# Patient Record
Sex: MALE | Race: WHITE | NOT HISPANIC OR LATINO | URBAN - METROPOLITAN AREA
[De-identification: names, ages, dates, MRNs, and addresses within clinical notes are randomized per-mention and may not be internally consistent; named-entity substitution may affect disease eponyms.]

---

## 2017-03-27 ENCOUNTER — EMERGENCY (EMERGENCY)
Facility: HOSPITAL | Age: 52
LOS: 0 days | Discharge: HOME | End: 2017-03-28
Attending: EMERGENCY MEDICINE | Admitting: EMERGENCY MEDICINE
Payer: SELF-PAY

## 2017-03-27 VITALS
DIASTOLIC BLOOD PRESSURE: 76 MMHG | SYSTOLIC BLOOD PRESSURE: 130 MMHG | RESPIRATION RATE: 16 BRPM | WEIGHT: 162.04 LBS | TEMPERATURE: 98 F | HEART RATE: 89 BPM

## 2017-03-27 DIAGNOSIS — F10.129 ALCOHOL ABUSE WITH INTOXICATION, UNSPECIFIED: ICD-10-CM

## 2017-03-27 PROCEDURE — 99283 EMERGENCY DEPT VISIT LOW MDM: CPT

## 2017-03-28 VITALS
TEMPERATURE: 99 F | OXYGEN SATURATION: 97 % | RESPIRATION RATE: 16 BRPM | DIASTOLIC BLOOD PRESSURE: 83 MMHG | SYSTOLIC BLOOD PRESSURE: 142 MMHG | HEART RATE: 101 BPM

## 2017-03-28 NOTE — ED PROVIDER NOTE - MUSCULOSKELETAL, MLM
Spine appears normal, range of motion is not limited, no muscle or joint tenderness. No evidence of bony deformity.

## 2017-03-28 NOTE — ED PROVIDER NOTE - MEDICAL DECISION MAKING DETAILS
Pt very comfortable, NAD.  Ambulatory in the ER wo any ataxia.  Stable for DC to follow up with PMD.

## 2017-03-28 NOTE — ED PROVIDER NOTE - CONSTITUTIONAL, MLM
normal... Well appearing, well nourished, awake, alert, oriented to person, place, time/situation and in no apparent distress. AOB. Disheveled, unkempt.

## 2017-03-28 NOTE — ED PROVIDER NOTE - ENMT, MLM
Airway patent, Nasal mucosa clear. Mouth with normal mucosa. Throat has no vesicles, no oropharyngeal exudates and uvula is midline. No septal heme. No hemotympanum. No intraoral lesions. No midline c-spine tenderness.

## 2017-03-28 NOTE — ED PROVIDER NOTE - NS ED MD SCRIBE ATTENDING SCRIBE SECTIONS
PHYSICAL EXAM/PAST MEDICAL/SURGICAL/SOCIAL HISTORY/DISPOSITION/REVIEW OF SYSTEMS/HISTORY OF PRESENT ILLNESS/PROGRESS NOTE/RESULTS

## 2017-03-28 NOTE — ED PROVIDER NOTE - OBJECTIVE STATEMENT
52 y/o M presents to the ED for ETOH intoxication. Per EMS pt tripped and fell, denies head injury, denies LOC. Currently pt has no other complaints and denies neck pain, headache, and back pain.

## 2021-10-13 ENCOUNTER — EMERGENCY (EMERGENCY)
Facility: HOSPITAL | Age: 56
LOS: 1 days | Discharge: HOME | End: 2021-10-13
Attending: EMERGENCY MEDICINE
Payer: MEDICARE

## 2021-10-13 VITALS
DIASTOLIC BLOOD PRESSURE: 99 MMHG | SYSTOLIC BLOOD PRESSURE: 151 MMHG | OXYGEN SATURATION: 97 % | TEMPERATURE: 98 F | HEIGHT: 65 IN | WEIGHT: 164.02 LBS | RESPIRATION RATE: 15 BRPM | HEART RATE: 92 BPM

## 2021-10-13 PROCEDURE — 99283 EMERGENCY DEPT VISIT LOW MDM: CPT

## 2021-10-13 RX ORDER — IBUPROFEN 200 MG
600 TABLET ORAL ONCE
Refills: 0 | Status: COMPLETED | OUTPATIENT
Start: 2021-10-13 | End: 2021-10-13

## 2021-10-13 RX ORDER — IBUPROFEN 200 MG
1 TABLET ORAL
Qty: 30 | Refills: 0
Start: 2021-10-13

## 2021-10-13 RX ORDER — CYCLOBENZAPRINE HYDROCHLORIDE 10 MG/1
5 TABLET, FILM COATED ORAL ONCE
Refills: 0 | Status: COMPLETED | OUTPATIENT
Start: 2021-10-13 | End: 2021-10-13

## 2021-10-13 RX ORDER — CYCLOBENZAPRINE HYDROCHLORIDE 10 MG/1
1 TABLET, FILM COATED ORAL
Qty: 10 | Refills: 0
Start: 2021-10-13

## 2021-10-13 RX ADMIN — CYCLOBENZAPRINE HYDROCHLORIDE 5 MILLIGRAM(S): 10 TABLET, FILM COATED ORAL at 13:37

## 2021-10-13 RX ADMIN — Medication 600 MILLIGRAM(S): at 13:38

## 2021-10-13 NOTE — ED PROVIDER NOTE - OBJECTIVE STATEMENT
56 year old male states he was wrestling on the lawn 2 days ago, fell onto his buttock, felt his " sciatica get aggravated." states today he was walking and it worsened, now going down his left leg. able to bear weight and ambulate. Admits to 56 year old male states he was wrestling on the lawn 2 days ago, fell onto his buttock, felt his " sciatica get aggravated." states today he was walking and it worsened, now going down his left leg. able to bear weight and ambulate. Admits to abrasion to left ankle. Denies any OTC meds for symptoms, admits to stretching exercise with mild temp relief. 56 year old male states he was wrestling on the lawn 2 days ago, fell onto his buttock, felt his " sciatica get aggravated." states today he was walking and it worsened, now going down his left leg. able to bear weight and ambulate. Denies abd pain/N/V/saddle anesthesia/weakness/parasthesia.   Admits to abrasion to left ankle. Denies any OTC meds for symptoms, admits to stretching exercise with mild temp relief.

## 2021-10-13 NOTE — ED PROVIDER NOTE - MUSCULOSKELETAL MINIMAL EXAM
+ tenderness noted to left SI joint, negative SLR b/l + tenderness noted to left SI joint, negative SLR b/l.

## 2021-10-13 NOTE — ED ADULT TRIAGE NOTE - CHIEF COMPLAINT QUOTE
pt with c/o "sciatica pain" left buttocks pain radiating down left leg causing left foot numbness x2 days. pt ambulatory without assist.

## 2021-10-13 NOTE — ED PROVIDER NOTE - PROGRESS NOTE DETAILS
follow up with orthopedic as discussed. if any symptoms worsen or any new symptoms occur, return to eD

## 2021-10-13 NOTE — ED PROVIDER NOTE - PATIENT PORTAL LINK FT
You can access the FollowMyHealth Patient Portal offered by [facility] by registering at the following website: [email or web address]. By joining [employer]’s FollowMyHealth portal, you will also be able to view your health information using other applications (apps) compatible with our system.

## 2021-10-13 NOTE — ED PROVIDER NOTE - CONSTITUTIONAL, MLM
normal... Well appearing, awake, alert, oriented to person, place, time/situation and in no apparent distress.ambulating comfortably

## 2022-03-05 ENCOUNTER — EMERGENCY (EMERGENCY)
Facility: HOSPITAL | Age: 57
LOS: 1 days | Discharge: HOME | End: 2022-03-05
Attending: EMERGENCY MEDICINE
Payer: MEDICARE

## 2022-03-05 VITALS
RESPIRATION RATE: 18 BRPM | HEIGHT: 65 IN | DIASTOLIC BLOOD PRESSURE: 52 MMHG | SYSTOLIC BLOOD PRESSURE: 129 MMHG | OXYGEN SATURATION: 93 % | TEMPERATURE: 98 F | WEIGHT: 199.96 LBS | HEART RATE: 109 BPM

## 2022-03-05 VITALS
SYSTOLIC BLOOD PRESSURE: 118 MMHG | DIASTOLIC BLOOD PRESSURE: 69 MMHG | HEART RATE: 89 BPM | OXYGEN SATURATION: 96 % | RESPIRATION RATE: 18 BRPM | TEMPERATURE: 98 F

## 2022-03-05 PROCEDURE — 99284 EMERGENCY DEPT VISIT MOD MDM: CPT

## 2022-03-05 PROCEDURE — 99283 EMERGENCY DEPT VISIT LOW MDM: CPT

## 2022-03-05 RX ADMIN — Medication 50 MILLIGRAM(S): at 20:59

## 2022-03-05 NOTE — ED ADULT NURSE NOTE - OBJECTIVE STATEMENT
BIBEMS from [employer] for alcohol intoxication. Pt states, "I drank a lot of beers". GCS 14. Pt placed in yellow gown. Belongings secured.

## 2022-03-05 NOTE — ED ADULT NURSE NOTE - CHIEF COMPLAINT QUOTE
BIBEMS from [employer] for alcohol intoxication. Pt states, "I drank a lot of beers". GCS 14. Pt placed in yellow gown. Belongings secured.

## 2022-03-05 NOTE — ED ADULT NURSE NOTE - ASSOCIATED SYMPTOMS
BIBEMS from [employer] for alcohol intoxication. Pt states, "I drank a lot of beers". GCS 14. Pt placed in yellow gown. Belongings secured.

## 2022-03-05 NOTE — ED PROVIDER NOTE - PHYSICAL EXAMINATION
Gen: No acute distress, non toxic. no tremors  HEENT: Mucous membranes moist, pink conjunctivae, EOMI. facial flushing. no tongue fasiculations. ncat  CV: pulse ~105, nl s1/s2.  Resp: CTAB, normal rate and effort  GI: Abdomen soft, NT, ND. No rebound, no guarding  : No CVAT  Neuro: A&O x 3, moving all 4 extremities  MSK: No spine or joint tenderness to palpation  Skin: No rashes. intact and perfused.
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
## 2022-03-05 NOTE — ED ADULT TRIAGE NOTE - CHIEF COMPLAINT QUOTE
BIBEMS from [employer] for alcohol intoxication. Pt states, "I drank a lot of beers". GCS 14. Pt placed in yellow gown. Belongings secured.

## 2022-03-06 PROBLEM — Z78.9 OTHER SPECIFIED HEALTH STATUS: Chronic | Status: ACTIVE | Noted: 2021-10-13

## 2022-03-11 ENCOUNTER — EMERGENCY (EMERGENCY)
Facility: HOSPITAL | Age: 57
LOS: 1 days | Discharge: HOME | End: 2022-03-11
Attending: EMERGENCY MEDICINE
Payer: MEDICARE

## 2022-03-11 VITALS
WEIGHT: 195.11 LBS | OXYGEN SATURATION: 95 % | RESPIRATION RATE: 18 BRPM | TEMPERATURE: 98 F | HEART RATE: 97 BPM | HEIGHT: 65 IN | DIASTOLIC BLOOD PRESSURE: 75 MMHG | SYSTOLIC BLOOD PRESSURE: 129 MMHG

## 2022-03-11 PROCEDURE — 99284 EMERGENCY DEPT VISIT MOD MDM: CPT

## 2022-03-11 PROCEDURE — 96372 THER/PROPH/DIAG INJ SC/IM: CPT

## 2022-03-11 PROCEDURE — 99283 EMERGENCY DEPT VISIT LOW MDM: CPT | Mod: 25

## 2022-03-11 RX ORDER — CYCLOBENZAPRINE HYDROCHLORIDE 10 MG/1
1 TABLET, FILM COATED ORAL
Qty: 15 | Refills: 0
Start: 2022-03-11 | End: 2022-03-15

## 2022-03-11 RX ORDER — KETOROLAC TROMETHAMINE 30 MG/ML
15 SYRINGE (ML) INJECTION ONCE
Refills: 0 | Status: DISCONTINUED | OUTPATIENT
Start: 2022-03-11 | End: 2022-03-11

## 2022-03-11 RX ORDER — METHOCARBAMOL 500 MG/1
1500 TABLET, FILM COATED ORAL ONCE
Refills: 0 | Status: COMPLETED | OUTPATIENT
Start: 2022-03-11 | End: 2022-03-11

## 2022-03-11 RX ORDER — LIDOCAINE 4 G/100G
1 CREAM TOPICAL ONCE
Refills: 0 | Status: COMPLETED | OUTPATIENT
Start: 2022-03-11 | End: 2022-03-11

## 2022-03-11 RX ADMIN — METHOCARBAMOL 1500 MILLIGRAM(S): 500 TABLET, FILM COATED ORAL at 17:25

## 2022-03-11 RX ADMIN — LIDOCAINE 1 PATCH: 4 CREAM TOPICAL at 17:36

## 2022-03-11 RX ADMIN — Medication 15 MILLIGRAM(S): at 18:29

## 2022-03-11 RX ADMIN — Medication 15 MILLIGRAM(S): at 17:25

## 2022-03-11 NOTE — ED PROVIDER NOTE - PROGRESS NOTE DETAILS
[place]: pt feels better, neuro intact, steady gait, clinically appropriate without withdrawal. has ride home. return precautions. meds.
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
## 2022-03-11 NOTE — ED PROVIDER NOTE - PATIENT PORTAL LINK FT
You can access the FollowMyHealth Patient Portal offered by [facility] by registering at the following website: [email or web address]. By joining [employer]’s FollowMyHealth portal, you will also be able to view your health information using other applications (apps) compatible with our system.

## 2022-03-11 NOTE — ED PROVIDER NOTE - PHYSICAL EXAMINATION
Gen: No acute distress, non toxic etoh on breath  HEENT: Mucous membranes moist, pink conjunctivae, EOMI. ncat  Neck: no midline ttp  CV: RRR, nl s1/s2.  Resp: CTAB, normal rate and effort  GI: Abdomen soft, NT, ND. No rebound, no guarding  : No CVAT  Neuro: A&O x 3, moving all 4 extremities. sligthly slurring words.   MSK: No spine ttp. ttp over right lower back, no bruising/deformity. full rom b/l LE. no yesenia ttp  Skin: No rashes. intact and perfused.

## 2022-03-11 NOTE — ED PROVIDER NOTE - CLINICAL SUMMARY MEDICAL DECISION MAKING FREE TEXT BOX
alcohol abuse, 3 days right lower back pain, no sign trauma, neuro intact, ambulates, no yesenia ttp. will treat pain, suspect muscular, observe.

## 2022-03-11 NOTE — ED ADULT NURSE NOTE - NSIMPLEMENTINTERV_GEN_ALL_ED
Implemented All Fall Risk Interventions:  [place] to call system. Call bell, personal items and telephone within reach. Instruct patient to call for assistance. Room bathroom lighting operational. Non-slip footwear when patient is off stretcher. Physically safe environment: no spills, clutter or unnecessary equipment. Stretcher in lowest position, wheels locked, appropriate side rails in place. Provide visual cue, wrist band, yellow gown, etc. Monitor gait and stability. Monitor for mental status changes and reorient to person, place, and time. Review medications for side effects contributing to fall risk. Reinforce activity limits and safety measures with patient and family.

## 2022-03-11 NOTE — ED ADULT TRIAGE NOTE - CHIEF COMPLAINT QUOTE
patient c/o back pain for 3 days, worse on the right side. patient also admits to drinking beers today, alcohol on breath.

## 2022-03-11 NOTE — ED PROVIDER NOTE - NSFOLLOWUPINSTRUCTIONS_ED_ALL_ED_FT
Back Pain    Back pain is very common in adults. The cause of back pain is rarely dangerous and the pain often gets better over time. The cause of your back pain may not be known and may include strain of muscles or ligaments, degeneration of the spinal disks, or arthritis. Occasionally the pain may radiate down your leg(s). Over-the-counter medicines to reduce pain and inflammation are often the most helpful. Stretching and remaining active frequently helps the healing process.     SEEK IMMEDIATE MEDICAL CARE IF YOU HAVE ANY OF THE FOLLOWING SYMPTOMS: bowel or bladder control problems, unusual weakness or numbness in your arms or legs, nausea or vomiting, abdominal pain, fever, dizziness/lightheadedness.    Alcohol Abuse    Alcohol intoxication occurs when the amount of alcohol that a person has consumed impairs his or her ability to mentally and physically function. Chronic alcohol consumption can also lead to a variety of health issues including neurological disease, stomach disease, heart disease, liver disease, etc. Do not drive after drinking alcohol. Drinking enough alcohol to end up in an Emergency Room suggests you may have an alcohol abuse problem. Seek help at a drug addiction center.    SEEK IMMEDIATE MEDICAL CARE IF YOU HAVE ANY OF THE FOLLOWING SYMPTOMS: seizures, vomiting blood, blood in your stool, lightheadedness/dizziness, or becoming shaky to tremulous when you stop drinking.

## 2022-03-18 ENCOUNTER — EMERGENCY (EMERGENCY)
Facility: HOSPITAL | Age: 57
LOS: 1 days | Discharge: HOME | End: 2022-03-18
Attending: EMERGENCY MEDICINE
Payer: MEDICARE

## 2022-03-18 PROCEDURE — 99284 EMERGENCY DEPT VISIT MOD MDM: CPT

## 2022-03-19 VITALS
OXYGEN SATURATION: 93 % | HEIGHT: 65 IN | RESPIRATION RATE: 18 BRPM | WEIGHT: 164.91 LBS | HEART RATE: 90 BPM | DIASTOLIC BLOOD PRESSURE: 93 MMHG | TEMPERATURE: 98 F | SYSTOLIC BLOOD PRESSURE: 150 MMHG

## 2022-03-19 PROCEDURE — 96372 THER/PROPH/DIAG INJ SC/IM: CPT

## 2022-03-19 PROCEDURE — 99283 EMERGENCY DEPT VISIT LOW MDM: CPT

## 2022-03-19 RX ORDER — METHOCARBAMOL 500 MG/1
2 TABLET, FILM COATED ORAL
Qty: 24 | Refills: 0
Start: 2022-03-19 | End: 2022-03-21

## 2022-03-19 RX ORDER — LIDOCAINE 4 G/100G
1 CREAM TOPICAL ONCE
Refills: 0 | Status: COMPLETED | OUTPATIENT
Start: 2022-03-19 | End: 2022-03-19

## 2022-03-19 RX ORDER — METHOCARBAMOL 500 MG/1
1500 TABLET, FILM COATED ORAL ONCE
Refills: 0 | Status: COMPLETED | OUTPATIENT
Start: 2022-03-19 | End: 2022-03-19

## 2022-03-19 RX ORDER — IBUPROFEN 200 MG
2 TABLET ORAL
Qty: 42 | Refills: 0
Start: 2022-03-19 | End: 2022-03-25

## 2022-03-19 RX ORDER — KETOROLAC TROMETHAMINE 30 MG/ML
30 SYRINGE (ML) INJECTION ONCE
Refills: 0 | Status: DISCONTINUED | OUTPATIENT
Start: 2022-03-19 | End: 2022-03-19

## 2022-03-19 RX ORDER — IBUPROFEN 200 MG
1 TABLET ORAL
Qty: 21 | Refills: 0
Start: 2022-03-19 | End: 2022-03-25

## 2022-03-19 RX ADMIN — LIDOCAINE 1 PATCH: 4 CREAM TOPICAL at 01:22

## 2022-03-19 RX ADMIN — Medication 30 MILLIGRAM(S): at 01:22

## 2022-03-19 RX ADMIN — METHOCARBAMOL 1500 MILLIGRAM(S): 500 TABLET, FILM COATED ORAL at 01:22

## 2022-03-19 NOTE — ED PROVIDER NOTE - CLINICAL SUMMARY MEDICAL DECISION MAKING FREE TEXT BOX
55 yo male with back pain. Neurovascularly intact. no red flag sxs or risk factors. Ambulating well in ED. Given spine follow up and pain control. Patient understands return precautions and f/u.

## 2022-03-19 NOTE — ED PROVIDER NOTE - ATTENDING CONTRIBUTION TO CARE
Radicular low back pain with hx of sciatica, no trauma, no midline tenderness suggestive of bony injury no other red flag symptoms suggestive of acute cord compression or cauda equina.

## 2022-03-19 NOTE — ED PROVIDER NOTE - OBJECTIVE STATEMENT
55 yo male with hx of etoh abuse presents to the ED for back pain. Pain began about one week ago. Located in lower back and radiating down right leg. Patient denies loss os sensation or muscle strength. Denies urinary or bowel incontinence or retention. Denies fever, drug use, hx of cancer. Denies trauma. Patient seen last week with similar complaint. At that time pain was relieved by cyclobenzaprine, as per the patient, but he is now out of medication.

## 2022-03-19 NOTE — ED PROVIDER NOTE - PATIENT PORTAL LINK FT
You can access the FollowMyHealth Patient Portal offered by [facility] by registering at the following website: [email or web address]. By joining [email or web address]’s FollowMyHealth portal, you will also be able to view your health information using other applications (apps) compatible with our system.

## 2022-03-19 NOTE — ED PROVIDER NOTE - CARE PROVIDER_API CALL
Priyank Dawson (DO)  Orthopaedic Surgery  [address] [place] [address], [address]  [place] [address]  Phone: [phone]  Fax: [phone]  Follow Up Time:

## 2022-08-03 ENCOUNTER — INPATIENT (INPATIENT)
Facility: HOSPITAL | Age: 57
LOS: 15 days | Discharge: NURSING, REHAB OR LONG-TERM CARE | DRG: 453 | End: 2022-08-19
Attending: STUDENT IN AN ORGANIZED HEALTH CARE EDUCATION/TRAINING PROGRAM | Admitting: SURGERY
Payer: MEDICARE

## 2022-08-03 ENCOUNTER — TRANSCRIPTION ENCOUNTER (OUTPATIENT)
Age: 57
End: 2022-08-03

## 2022-08-03 PROCEDURE — 99285 EMERGENCY DEPT VISIT HI MDM: CPT

## 2022-08-04 ENCOUNTER — APPOINTMENT (OUTPATIENT)
Dept: NEUROSURGERY | Facility: HOSPITAL | Age: 57
End: 2022-08-04

## 2022-08-04 ENCOUNTER — TRANSCRIPTION ENCOUNTER (OUTPATIENT)
Age: 57
End: 2022-08-04

## 2022-08-04 VITALS
OXYGEN SATURATION: 95 % | RESPIRATION RATE: 16 BRPM | HEIGHT: 65 IN | TEMPERATURE: 98 F | WEIGHT: 139.99 LBS | DIASTOLIC BLOOD PRESSURE: 48 MMHG | HEART RATE: 80 BPM | SYSTOLIC BLOOD PRESSURE: 86 MMHG

## 2022-08-04 DIAGNOSIS — S13.171A DISLOCATION OF C6/C7 CERVICAL VERTEBRAE, INITIAL ENCOUNTER: ICD-10-CM

## 2022-08-04 LAB
ABO RH CONFIRMATION: SIGNIFICANT CHANGE UP
ALBUMIN SERPL ELPH-MCNC: 3.4 G/DL — SIGNIFICANT CHANGE UP (ref 3.3–5.2)
ALBUMIN SERPL ELPH-MCNC: 3.8 G/DL — SIGNIFICANT CHANGE UP (ref 3.3–5.2)
ALP SERPL-CCNC: 71 U/L — SIGNIFICANT CHANGE UP (ref 40–120)
ALP SERPL-CCNC: 83 U/L — SIGNIFICANT CHANGE UP (ref 40–120)
ALT FLD-CCNC: 31 U/L — SIGNIFICANT CHANGE UP
ALT FLD-CCNC: 50 U/L — HIGH
AMPHET UR-MCNC: NEGATIVE — SIGNIFICANT CHANGE UP
ANION GAP SERPL CALC-SCNC: 10 MMOL/L — SIGNIFICANT CHANGE UP (ref 5–17)
ANION GAP SERPL CALC-SCNC: 14 MMOL/L — SIGNIFICANT CHANGE UP (ref 5–17)
APAP SERPL-MCNC: <3 UG/ML — LOW (ref 10–26)
APPEARANCE UR: CLEAR — SIGNIFICANT CHANGE UP
APTT BLD: 27.7 SEC — SIGNIFICANT CHANGE UP (ref 27.5–35.5)
APTT BLD: 28.2 SEC — SIGNIFICANT CHANGE UP (ref 27.5–35.5)
AST SERPL-CCNC: 45 U/L — HIGH
AST SERPL-CCNC: 97 U/L — HIGH
BACTERIA # UR AUTO: NEGATIVE — SIGNIFICANT CHANGE UP
BARBITURATES UR SCN-MCNC: NEGATIVE — SIGNIFICANT CHANGE UP
BASOPHILS # BLD AUTO: 0.04 K/UL — SIGNIFICANT CHANGE UP (ref 0–0.2)
BASOPHILS # BLD AUTO: 0.08 K/UL — SIGNIFICANT CHANGE UP (ref 0–0.2)
BASOPHILS NFR BLD AUTO: 0.3 % — SIGNIFICANT CHANGE UP (ref 0–2)
BASOPHILS NFR BLD AUTO: 1 % — SIGNIFICANT CHANGE UP (ref 0–2)
BENZODIAZ UR-MCNC: POSITIVE
BILIRUB DIRECT SERPL-MCNC: 0.1 MG/DL — SIGNIFICANT CHANGE UP (ref 0–0.3)
BILIRUB INDIRECT FLD-MCNC: 0.3 MG/DL — SIGNIFICANT CHANGE UP (ref 0.2–1)
BILIRUB SERPL-MCNC: 0.4 MG/DL — SIGNIFICANT CHANGE UP (ref 0.4–2)
BILIRUB SERPL-MCNC: <0.2 MG/DL — LOW (ref 0.4–2)
BILIRUB UR-MCNC: NEGATIVE — SIGNIFICANT CHANGE UP
BLD GP AB SCN SERPL QL: SIGNIFICANT CHANGE UP
BUN SERPL-MCNC: 8.3 MG/DL — SIGNIFICANT CHANGE UP (ref 8–20)
BUN SERPL-MCNC: 9.5 MG/DL — SIGNIFICANT CHANGE UP (ref 8–20)
CALCIUM SERPL-MCNC: 7.5 MG/DL — LOW (ref 8.4–10.5)
CALCIUM SERPL-MCNC: 8.6 MG/DL — SIGNIFICANT CHANGE UP (ref 8.4–10.5)
CHLORIDE SERPL-SCNC: 107 MMOL/L — SIGNIFICANT CHANGE UP (ref 98–107)
CHLORIDE SERPL-SCNC: 108 MMOL/L — HIGH (ref 98–107)
CO2 SERPL-SCNC: 20 MMOL/L — LOW (ref 22–29)
CO2 SERPL-SCNC: 22 MMOL/L — SIGNIFICANT CHANGE UP (ref 22–29)
COCAINE METAB.OTHER UR-MCNC: NEGATIVE — SIGNIFICANT CHANGE UP
COLOR SPEC: YELLOW — SIGNIFICANT CHANGE UP
CREAT SERPL-MCNC: 0.64 MG/DL — SIGNIFICANT CHANGE UP (ref 0.5–1.3)
CREAT SERPL-MCNC: 0.98 MG/DL — SIGNIFICANT CHANGE UP (ref 0.5–1.3)
DIFF PNL FLD: ABNORMAL
EGFR: 110 ML/MIN/1.73M2 — SIGNIFICANT CHANGE UP
EGFR: 89 ML/MIN/1.73M2 — SIGNIFICANT CHANGE UP
EOSINOPHIL # BLD AUTO: 0 K/UL — SIGNIFICANT CHANGE UP (ref 0–0.5)
EOSINOPHIL # BLD AUTO: 0.28 K/UL — SIGNIFICANT CHANGE UP (ref 0–0.5)
EOSINOPHIL NFR BLD AUTO: 0 % — SIGNIFICANT CHANGE UP (ref 0–6)
EOSINOPHIL NFR BLD AUTO: 3.5 % — SIGNIFICANT CHANGE UP (ref 0–6)
EPI CELLS # UR: SIGNIFICANT CHANGE UP
ETHANOL SERPL-MCNC: 324 MG/DL — HIGH (ref 0–9)
GLUCOSE SERPL-MCNC: 108 MG/DL — HIGH (ref 70–99)
GLUCOSE SERPL-MCNC: 128 MG/DL — HIGH (ref 70–99)
GLUCOSE UR QL: NEGATIVE MG/DL — SIGNIFICANT CHANGE UP
HCT VFR BLD CALC: 37.5 % — LOW (ref 39–50)
HCT VFR BLD CALC: 40.3 % — SIGNIFICANT CHANGE UP (ref 39–50)
HGB BLD-MCNC: 13.2 G/DL — SIGNIFICANT CHANGE UP (ref 13–17)
HGB BLD-MCNC: 14.3 G/DL — SIGNIFICANT CHANGE UP (ref 13–17)
IMM GRANULOCYTES NFR BLD AUTO: 0.6 % — SIGNIFICANT CHANGE UP (ref 0–1.5)
IMM GRANULOCYTES NFR BLD AUTO: 1.3 % — SIGNIFICANT CHANGE UP (ref 0–1.5)
INR BLD: 1.02 RATIO — SIGNIFICANT CHANGE UP (ref 0.88–1.16)
INR BLD: 1.09 RATIO — SIGNIFICANT CHANGE UP (ref 0.88–1.16)
KETONES UR-MCNC: ABNORMAL
LEUKOCYTE ESTERASE UR-ACNC: NEGATIVE — SIGNIFICANT CHANGE UP
LYMPHOCYTES # BLD AUTO: 0.54 K/UL — LOW (ref 1–3.3)
LYMPHOCYTES # BLD AUTO: 2.97 K/UL — SIGNIFICANT CHANGE UP (ref 1–3.3)
LYMPHOCYTES # BLD AUTO: 3.4 % — LOW (ref 13–44)
LYMPHOCYTES # BLD AUTO: 37.5 % — SIGNIFICANT CHANGE UP (ref 13–44)
MAGNESIUM SERPL-MCNC: 1.9 MG/DL — SIGNIFICANT CHANGE UP (ref 1.6–2.6)
MAGNESIUM SERPL-MCNC: 2.3 MG/DL — SIGNIFICANT CHANGE UP (ref 1.8–2.6)
MCHC RBC-ENTMCNC: 32.5 PG — SIGNIFICANT CHANGE UP (ref 27–34)
MCHC RBC-ENTMCNC: 33 PG — SIGNIFICANT CHANGE UP (ref 27–34)
MCHC RBC-ENTMCNC: 35.2 GM/DL — SIGNIFICANT CHANGE UP (ref 32–36)
MCHC RBC-ENTMCNC: 35.5 GM/DL — SIGNIFICANT CHANGE UP (ref 32–36)
MCV RBC AUTO: 92.4 FL — SIGNIFICANT CHANGE UP (ref 80–100)
MCV RBC AUTO: 93.1 FL — SIGNIFICANT CHANGE UP (ref 80–100)
METHADONE UR-MCNC: NEGATIVE — SIGNIFICANT CHANGE UP
MONOCYTES # BLD AUTO: 0.78 K/UL — SIGNIFICANT CHANGE UP (ref 0–0.9)
MONOCYTES # BLD AUTO: 1.36 K/UL — HIGH (ref 0–0.9)
MONOCYTES NFR BLD AUTO: 8.6 % — SIGNIFICANT CHANGE UP (ref 2–14)
MONOCYTES NFR BLD AUTO: 9.8 % — SIGNIFICANT CHANGE UP (ref 2–14)
MRSA PCR RESULT.: SIGNIFICANT CHANGE UP
NEUTROPHILS # BLD AUTO: 13.78 K/UL — HIGH (ref 1.8–7.4)
NEUTROPHILS # BLD AUTO: 3.72 K/UL — SIGNIFICANT CHANGE UP (ref 1.8–7.4)
NEUTROPHILS NFR BLD AUTO: 46.9 % — SIGNIFICANT CHANGE UP (ref 43–77)
NEUTROPHILS NFR BLD AUTO: 87.1 % — HIGH (ref 43–77)
NITRITE UR-MCNC: NEGATIVE — SIGNIFICANT CHANGE UP
OPIATES UR-MCNC: NEGATIVE — SIGNIFICANT CHANGE UP
PCP SPEC-MCNC: SIGNIFICANT CHANGE UP
PCP UR-MCNC: NEGATIVE — SIGNIFICANT CHANGE UP
PH UR: 5 — SIGNIFICANT CHANGE UP (ref 5–8)
PHOSPHATE SERPL-MCNC: 2.8 MG/DL — SIGNIFICANT CHANGE UP (ref 2.4–4.7)
PHOSPHATE SERPL-MCNC: 4.2 MG/DL — SIGNIFICANT CHANGE UP (ref 2.4–4.7)
PLATELET # BLD AUTO: 212 K/UL — SIGNIFICANT CHANGE UP (ref 150–400)
PLATELET # BLD AUTO: 224 K/UL — SIGNIFICANT CHANGE UP (ref 150–400)
POTASSIUM SERPL-MCNC: 3.7 MMOL/L — SIGNIFICANT CHANGE UP (ref 3.5–5.3)
POTASSIUM SERPL-MCNC: 4 MMOL/L — SIGNIFICANT CHANGE UP (ref 3.5–5.3)
POTASSIUM SERPL-SCNC: 3.7 MMOL/L — SIGNIFICANT CHANGE UP (ref 3.5–5.3)
POTASSIUM SERPL-SCNC: 4 MMOL/L — SIGNIFICANT CHANGE UP (ref 3.5–5.3)
PROT SERPL-MCNC: 6.1 G/DL — LOW (ref 6.6–8.7)
PROT SERPL-MCNC: 6.6 G/DL — SIGNIFICANT CHANGE UP (ref 6.6–8.7)
PROT UR-MCNC: NEGATIVE — SIGNIFICANT CHANGE UP
PROTHROM AB SERPL-ACNC: 11.8 SEC — SIGNIFICANT CHANGE UP (ref 10.5–13.4)
PROTHROM AB SERPL-ACNC: 12.7 SEC — SIGNIFICANT CHANGE UP (ref 10.5–13.4)
RBC # BLD: 4.06 M/UL — LOW (ref 4.2–5.8)
RBC # BLD: 4.33 M/UL — SIGNIFICANT CHANGE UP (ref 4.2–5.8)
RBC # FLD: 12.2 % — SIGNIFICANT CHANGE UP (ref 10.3–14.5)
RBC # FLD: 12.3 % — SIGNIFICANT CHANGE UP (ref 10.3–14.5)
RBC CASTS # UR COMP ASSIST: SIGNIFICANT CHANGE UP /HPF (ref 0–4)
S AUREUS DNA NOSE QL NAA+PROBE: SIGNIFICANT CHANGE UP
SARS-COV-2 RNA SPEC QL NAA+PROBE: SIGNIFICANT CHANGE UP
SODIUM SERPL-SCNC: 139 MMOL/L — SIGNIFICANT CHANGE UP (ref 135–145)
SODIUM SERPL-SCNC: 142 MMOL/L — SIGNIFICANT CHANGE UP (ref 135–145)
SP GR SPEC: 1.02 — SIGNIFICANT CHANGE UP (ref 1.01–1.02)
THC UR QL: NEGATIVE — SIGNIFICANT CHANGE UP
UROBILINOGEN FLD QL: NEGATIVE MG/DL — SIGNIFICANT CHANGE UP
WBC # BLD: 15.81 K/UL — HIGH (ref 3.8–10.5)
WBC # BLD: 7.93 K/UL — SIGNIFICANT CHANGE UP (ref 3.8–10.5)
WBC # FLD AUTO: 15.81 K/UL — HIGH (ref 3.8–10.5)
WBC # FLD AUTO: 7.93 K/UL — SIGNIFICANT CHANGE UP (ref 3.8–10.5)
WBC UR QL: SIGNIFICANT CHANGE UP /HPF (ref 0–5)

## 2022-08-04 PROCEDURE — 36556 INSERT NON-TUNNEL CV CATH: CPT

## 2022-08-04 PROCEDURE — 93010 ELECTROCARDIOGRAM REPORT: CPT

## 2022-08-04 PROCEDURE — G1004: CPT

## 2022-08-04 PROCEDURE — 71045 X-RAY EXAM CHEST 1 VIEW: CPT | Mod: 26

## 2022-08-04 PROCEDURE — 63045 LAM FACETEC & FORAMOT CRV: CPT | Mod: AS

## 2022-08-04 PROCEDURE — 22842 INSERT SPINE FIXATION DEVICE: CPT | Mod: AS

## 2022-08-04 PROCEDURE — 70498 CT ANGIOGRAPHY NECK: CPT | Mod: 26

## 2022-08-04 PROCEDURE — 76937 US GUIDE VASCULAR ACCESS: CPT | Mod: 26

## 2022-08-04 PROCEDURE — 99222 1ST HOSP IP/OBS MODERATE 55: CPT

## 2022-08-04 PROCEDURE — 72125 CT NECK SPINE W/O DYE: CPT | Mod: 26,77

## 2022-08-04 PROCEDURE — 70450 CT HEAD/BRAIN W/O DYE: CPT | Mod: 26,MG

## 2022-08-04 PROCEDURE — 22600 ARTHRD PST TQ 1NTRSPC CRV: CPT

## 2022-08-04 PROCEDURE — 22853 INSJ BIOMECHANICAL DEVICE: CPT | Mod: AS

## 2022-08-04 PROCEDURE — 22845 INSERT SPINE FIXATION DEVICE: CPT | Mod: 59

## 2022-08-04 PROCEDURE — 22845 INSERT SPINE FIXATION DEVICE: CPT | Mod: AS,59

## 2022-08-04 PROCEDURE — 63045 LAM FACETEC & FORAMOT CRV: CPT

## 2022-08-04 PROCEDURE — 22842 INSERT SPINE FIXATION DEVICE: CPT

## 2022-08-04 PROCEDURE — 22551 ARTHRD ANT NTRBDY CERVICAL: CPT

## 2022-08-04 PROCEDURE — 22600 ARTHRD PST TQ 1NTRSPC CRV: CPT | Mod: AS

## 2022-08-04 PROCEDURE — 72125 CT NECK SPINE W/O DYE: CPT | Mod: 26,MG

## 2022-08-04 PROCEDURE — 63048 LAM FACETEC &FORAMOT EA ADDL: CPT | Mod: AS

## 2022-08-04 PROCEDURE — 72131 CT LUMBAR SPINE W/O DYE: CPT | Mod: 26

## 2022-08-04 PROCEDURE — 72050 X-RAY EXAM NECK SPINE 4/5VWS: CPT | Mod: 26

## 2022-08-04 PROCEDURE — 71260 CT THORAX DX C+: CPT | Mod: 26,MG

## 2022-08-04 PROCEDURE — 72148 MRI LUMBAR SPINE W/O DYE: CPT | Mod: 26

## 2022-08-04 PROCEDURE — 22614 ARTHRD PST TQ 1NTRSPC EA ADD: CPT

## 2022-08-04 PROCEDURE — 22551 ARTHRD ANT NTRBDY CERVICAL: CPT | Mod: AS

## 2022-08-04 PROCEDURE — 72146 MRI CHEST SPINE W/O DYE: CPT | Mod: 26

## 2022-08-04 PROCEDURE — 72141 MRI NECK SPINE W/O DYE: CPT | Mod: 26

## 2022-08-04 PROCEDURE — 63048 LAM FACETEC &FORAMOT EA ADDL: CPT

## 2022-08-04 PROCEDURE — 72128 CT CHEST SPINE W/O DYE: CPT | Mod: 26

## 2022-08-04 PROCEDURE — 36620 INSERTION CATHETER ARTERY: CPT

## 2022-08-04 PROCEDURE — 74177 CT ABD & PELVIS W/CONTRAST: CPT | Mod: 26,MA

## 2022-08-04 PROCEDURE — 22614 ARTHRD PST TQ 1NTRSPC EA ADD: CPT | Mod: AS

## 2022-08-04 PROCEDURE — 22853 INSJ BIOMECHANICAL DEVICE: CPT

## 2022-08-04 DEVICE — SCREW POLYAXIAL 3.5X14MM: Type: IMPLANTABLE DEVICE | Status: FUNCTIONAL

## 2022-08-04 DEVICE — SCREW OZARK SELF START 4.35X16MM: Type: IMPLANTABLE DEVICE | Status: FUNCTIONAL

## 2022-08-04 DEVICE — OSTEOSELECT DBM PLUS 2.5CC: Type: IMPLANTABLE DEVICE | Status: FUNCTIONAL

## 2022-08-04 DEVICE — SCREW SET YUKON: Type: IMPLANTABLE DEVICE | Status: FUNCTIONAL

## 2022-08-04 DEVICE — SPONGE HSTAT SURGICEL 2X14": Type: IMPLANTABLE DEVICE | Status: FUNCTIONAL

## 2022-08-04 DEVICE — OSTEOSELECT DBM PLUS 5CC: Type: IMPLANTABLE DEVICE | Status: FUNCTIONAL

## 2022-08-04 DEVICE — KIT A-LINE 1LUM 20GX12CM SAFE KIT: Type: IMPLANTABLE DEVICE | Status: FUNCTIONAL

## 2022-08-04 DEVICE — PLATE 1 LVL 20MM: Type: IMPLANTABLE DEVICE | Status: FUNCTIONAL

## 2022-08-04 DEVICE — PIN DISTRACTION ST TI 14MM YELLOW: Type: IMPLANTABLE DEVICE | Status: FUNCTIONAL

## 2022-08-04 DEVICE — IMP CASCADIA CERV LORD 7 DEG 12X14X6MM: Type: IMPLANTABLE DEVICE | Status: FUNCTIONAL

## 2022-08-04 DEVICE — SPONGE HSTAT GELFOAM 12X7MM: Type: IMPLANTABLE DEVICE | Status: FUNCTIONAL

## 2022-08-04 DEVICE — SCREW POLYAXIAL YUKON 5X30MM: Type: IMPLANTABLE DEVICE | Status: FUNCTIONAL

## 2022-08-04 DEVICE — IMPLANTABLE DEVICE: Type: IMPLANTABLE DEVICE | Status: FUNCTIONAL

## 2022-08-04 DEVICE — SEALANT FLOSEAL FAST PREP HEMOSTATIC MATRIX 10ML: Type: IMPLANTABLE DEVICE | Status: FUNCTIONAL

## 2022-08-04 DEVICE — SCREW POLYAXIAL 5X28MM: Type: IMPLANTABLE DEVICE | Status: FUNCTIONAL

## 2022-08-04 DEVICE — KIT SURGIFLO HEMOSTATIC MATRIX: Type: IMPLANTABLE DEVICE | Status: FUNCTIONAL

## 2022-08-04 RX ORDER — SODIUM CHLORIDE 9 MG/ML
10 INJECTION INTRAMUSCULAR; INTRAVENOUS; SUBCUTANEOUS
Refills: 0 | Status: DISCONTINUED | OUTPATIENT
Start: 2022-08-04 | End: 2022-08-08

## 2022-08-04 RX ORDER — CEFAZOLIN SODIUM 1 G
2000 VIAL (EA) INJECTION EVERY 8 HOURS
Refills: 0 | Status: COMPLETED | OUTPATIENT
Start: 2022-08-04 | End: 2022-08-05

## 2022-08-04 RX ORDER — OXYCODONE HYDROCHLORIDE 5 MG/1
5 TABLET ORAL EVERY 4 HOURS
Refills: 0 | Status: DISCONTINUED | OUTPATIENT
Start: 2022-08-04 | End: 2022-08-05

## 2022-08-04 RX ORDER — CHLORHEXIDINE GLUCONATE 213 G/1000ML
1 SOLUTION TOPICAL DAILY
Refills: 0 | Status: DISCONTINUED | OUTPATIENT
Start: 2022-08-04 | End: 2022-08-19

## 2022-08-04 RX ORDER — MIDAZOLAM HYDROCHLORIDE 1 MG/ML
5 INJECTION, SOLUTION INTRAMUSCULAR; INTRAVENOUS ONCE
Refills: 0 | Status: DISCONTINUED | OUTPATIENT
Start: 2022-08-04 | End: 2022-08-04

## 2022-08-04 RX ORDER — SODIUM CHLORIDE 9 MG/ML
10 INJECTION INTRAMUSCULAR; INTRAVENOUS; SUBCUTANEOUS
Refills: 0 | Status: DISCONTINUED | OUTPATIENT
Start: 2022-08-04 | End: 2022-08-04

## 2022-08-04 RX ORDER — CHLORHEXIDINE GLUCONATE 213 G/1000ML
1 SOLUTION TOPICAL
Refills: 0 | Status: DISCONTINUED | OUTPATIENT
Start: 2022-08-04 | End: 2022-08-08

## 2022-08-04 RX ORDER — SODIUM CHLORIDE 9 MG/ML
1000 INJECTION, SOLUTION INTRAVENOUS
Refills: 0 | Status: DISCONTINUED | OUTPATIENT
Start: 2022-08-04 | End: 2022-08-04

## 2022-08-04 RX ORDER — SODIUM CHLORIDE 9 MG/ML
1000 INJECTION, SOLUTION INTRAVENOUS
Refills: 0 | Status: DISCONTINUED | OUTPATIENT
Start: 2022-08-04 | End: 2022-08-06

## 2022-08-04 RX ORDER — ACETAMINOPHEN 500 MG
1000 TABLET ORAL EVERY 6 HOURS
Refills: 0 | Status: COMPLETED | OUTPATIENT
Start: 2022-08-04 | End: 2022-08-05

## 2022-08-04 RX ORDER — CHLORHEXIDINE GLUCONATE 213 G/1000ML
1 SOLUTION TOPICAL
Refills: 0 | Status: DISCONTINUED | OUTPATIENT
Start: 2022-08-04 | End: 2022-08-04

## 2022-08-04 RX ORDER — SODIUM CHLORIDE 9 MG/ML
2000 INJECTION INTRAMUSCULAR; INTRAVENOUS; SUBCUTANEOUS ONCE
Refills: 0 | Status: COMPLETED | OUTPATIENT
Start: 2022-08-04 | End: 2022-08-04

## 2022-08-04 RX ORDER — PHENYLEPHRINE HYDROCHLORIDE 10 MG/ML
0.2 INJECTION INTRAVENOUS
Qty: 40 | Refills: 0 | Status: DISCONTINUED | OUTPATIENT
Start: 2022-08-04 | End: 2022-08-04

## 2022-08-04 RX ORDER — OXYCODONE HYDROCHLORIDE 5 MG/1
10 TABLET ORAL EVERY 4 HOURS
Refills: 0 | Status: DISCONTINUED | OUTPATIENT
Start: 2022-08-04 | End: 2022-08-05

## 2022-08-04 RX ORDER — SODIUM CHLORIDE 9 MG/ML
1000 INJECTION, SOLUTION INTRAVENOUS ONCE
Refills: 0 | Status: COMPLETED | OUTPATIENT
Start: 2022-08-04 | End: 2022-08-04

## 2022-08-04 RX ORDER — PHENYLEPHRINE HYDROCHLORIDE 10 MG/ML
0.1 INJECTION INTRAVENOUS
Qty: 40 | Refills: 0 | Status: DISCONTINUED | OUTPATIENT
Start: 2022-08-04 | End: 2022-08-05

## 2022-08-04 RX ADMIN — SODIUM CHLORIDE 2000 MILLILITER(S): 9 INJECTION INTRAMUSCULAR; INTRAVENOUS; SUBCUTANEOUS at 00:30

## 2022-08-04 RX ADMIN — SODIUM CHLORIDE 2000 MILLILITER(S): 9 INJECTION INTRAMUSCULAR; INTRAVENOUS; SUBCUTANEOUS at 01:14

## 2022-08-04 RX ADMIN — Medication 400 MILLIGRAM(S): at 23:04

## 2022-08-04 RX ADMIN — MIDAZOLAM HYDROCHLORIDE 5 MILLIGRAM(S): 1 INJECTION, SOLUTION INTRAMUSCULAR; INTRAVENOUS at 01:33

## 2022-08-04 RX ADMIN — Medication 1 MILLIGRAM(S): at 05:23

## 2022-08-04 RX ADMIN — Medication 100 MILLIGRAM(S): at 18:00

## 2022-08-04 RX ADMIN — SODIUM CHLORIDE 125 MILLILITER(S): 9 INJECTION, SOLUTION INTRAVENOUS at 04:59

## 2022-08-04 RX ADMIN — PHENYLEPHRINE HYDROCHLORIDE 6.45 MICROGRAM(S)/KG/MIN: 10 INJECTION INTRAVENOUS at 04:58

## 2022-08-04 RX ADMIN — Medication 2 MILLIGRAM(S): at 03:00

## 2022-08-04 RX ADMIN — Medication 100 MILLIGRAM(S): at 23:05

## 2022-08-04 RX ADMIN — CHLORHEXIDINE GLUCONATE 1 APPLICATION(S): 213 SOLUTION TOPICAL at 05:26

## 2022-08-04 RX ADMIN — Medication 400 MILLIGRAM(S): at 18:00

## 2022-08-04 RX ADMIN — SODIUM CHLORIDE 75 MILLILITER(S): 9 INJECTION, SOLUTION INTRAVENOUS at 18:01

## 2022-08-04 RX ADMIN — Medication 2 MILLIGRAM(S): at 02:37

## 2022-08-04 RX ADMIN — SODIUM CHLORIDE 1000 MILLILITER(S): 9 INJECTION, SOLUTION INTRAVENOUS at 04:59

## 2022-08-04 RX ADMIN — PHENYLEPHRINE HYDROCHLORIDE 3.23 MICROGRAM(S)/KG/MIN: 10 INJECTION INTRAVENOUS at 18:10

## 2022-08-04 NOTE — PATIENT PROFILE ADULT - FUNCTIONAL ASSESSMENT - BASIC MOBILITY 6.
1-calculated by average/Not able to assess (calculate score using [facility] averaging method)

## 2022-08-04 NOTE — H&P ADULT - NSHPLABSRESULTS_GEN_ALL_CORE
Comprehensive Metabolic Panel (08.04.22 @ 00:15)   Sodium, Serum: 142 mmol/L   Potassium, Serum: 3.7 mmol/L   Chloride, Serum: 108 mmol/L   Carbon Dioxide, Serum: 20.0 mmol/L   Anion Gap, Serum: 14 mmol/L   Blood Urea Nitrogen, Serum: 9.5 mg/dL   Creatinine, Serum: 0.98 mg/dL   Glucose, Serum: 108 mg/dL   Calcium, Total Serum: 8.6 mg/dL   Protein Total, Serum: 6.6 g/dL   Albumin, Serum: 3.8 g/dL   Bilirubin Total, Serum: <0.2 mg/dL   Alkaline Phosphatase, Serum: 83 U/L   Aspartate Aminotransferase (AST/SGOT): 45 U/L   Alanine Aminotransferase (ALT/SGPT): 31 U/L   eGFR: 89: The estimated glomerular filtration rate (eGFR) is calculated using the   2021 CKD-EPI creatinine equation, which does not have a coefficient for   race and is validated in individuals 18 years oComplete Blood Count + Automated Diff (08.04.22 @ 00:15)   WBC Count: 7.93 K/uL   RBC Count: 4.33 M/uL   Hemoglobin: 14.3 g/dL       < from: CT Cervical Spine No Cont (08.04.22 @ 00:31) >    IMPRESSION:  CT Head:  No CT evidence of acute intracranial pathology.    CT Cervical Spine: Complex fracture dislocation at C6-C7 with bilateral   jumped facets and traumatic grade 2 anterolisthesis.  There is a three   column injury with probable complete ligamentous disruption at the C6-C7   level.  There is only mild spinal canal stenosis at C6-C7, but this does   not exclude the possibility of cord contusion.    The findings were discussedwith Dr. Cleveland on 08/04/2022 at 12:40 AM.    Read back verification was obtained    < end of copied text >      Hematocrit: 40.3 %   Mean Cell Volume: 93.1 fl   Mean Cell Hemoglobin: 33.0 pg   Mean Cell Hemoglobin Conc: 35.5 gm/dL   Red Cell Distrib Width: 12.2 %   Platelet Count - Automated: 224 K/uL   Auto Neutrophil #: 3.72 K/uL   Auto Lymphocyte #: 2.97 K/uL   Auto Monocyte #: 0.78 K/uL   Auto Eosinophil #: 0.28 K/uL   Auto Basophil #: 0.08 K/uL   Auto Neutrophil %: 46.9: Differential percentages must be correlated with absolute numbers for   clinical significance. %   Auto Lymphocyte %: 37.5 %   Auto Monocyte %: 9.8 %
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
## 2022-08-04 NOTE — BRIEF OPERATIVE NOTE - COMMENTS
neuromonitoring used throughout, b/l biceps and deltoids present w/ SSEP only in UE's
neuromonitoring used throughout, b/l biceps and deltoids present w/ SSEP only in UE's
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
## 2022-08-04 NOTE — BRIEF OPERATIVE NOTE - NSICDXBRIEFPROCEDURE_GEN_ALL_CORE_FT
PROCEDURES:  Posterior cervical fusion with instrumentation 04-Aug-2022 16:42:34  Jamin Olivera  Fusion, spine, thoracic, posterior approach 04-Aug-2022 16:43:04  Jamin Olivera  
PROCEDURES:  Anterior cervical discectomy with fusion (ACDF) 04-Aug-2022 16:33:50  Jamin Olivera
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
## 2022-08-04 NOTE — PATIENT PROFILE ADULT - FALL HARM RISK - HARM RISK INTERVENTIONS
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
## 2022-08-04 NOTE — ED PROVIDER NOTE - PHYSICAL EXAMINATION
General: poor hygiene, EtOH on breath, minimally responsive  Head:  NC, AT  Eyes: EOMI, PERRLA  Ears: no erythema/drainage  Nose: midline, no bleeding/drainage  Cardiac: RRR, no m/r/g  Respiratory: CTABL, equal chest wall expansions, no conversational dyspnea  Abdomen: soft, ND, NT  Pelvis: stable  MSK/Vascular: distal pulses intact, soft compartments, warm extremities, no visible ecchymosis or palpable deformity  Neuro: moaning nonsense words, following commands intermittently, not answering questions  Psych: calm, cooperative, normal affect General: poor hygiene, EtOH on breath, minimally responsive  Head/neck:  NC, AT, c-collar in place  Eyes: EOMI, PERRLA  Ears: no erythema/drainage  Nose: midline, no bleeding/drainage  Cardiac: RRR, no m/r/g  Respiratory: CTABL, equal chest wall expansions, no conversational dyspnea  Abdomen: soft, ND, NT  Pelvis: stable  MSK/Vascular: distal pulses intact, soft compartments, warm extremities, no visible ecchymosis or palpable deformity  Neuro: moaning nonsense words, following commands intermittently, not answering questions  Psych: calm, cooperative, normal affect

## 2022-08-04 NOTE — BRIEF OPERATIVE NOTE - TYPE OF ANESTHESIA
Problem: Goal Outcome Summary  Goal: Goal Outcome Summary  Outcome: No Change  Feeds increased to 52ml q3hrs. Tolerating well, no emesis/distention. Good stool output. Passed hearing screen today. Remains on 1/8L O2 nasal cannula, off the wall. No spells except for when bottling. Had choking episode with HR dip and desat with the 0900 bottle, and 2 similar episodes with the 1200 bottle, and the oral feeding attempt was cut short.. She was gavaged at 1500. Her reported reddened buttocks is much improved, just very slight redness noted. No contact with parents this shift.      
General
General
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
## 2022-08-04 NOTE — BRIEF OPERATIVE NOTE - NSICDXBRIEFPOSTOP_GEN_ALL_CORE_FT
POST-OP DIAGNOSIS:  Cervical spine fracture 04-Aug-2022 16:34:23 with cord compression Jamin Olivera  
POST-OP DIAGNOSIS:  Cervical spine fracture 04-Aug-2022 16:34:23 with cord compression Jamin Olivera

## 2022-08-04 NOTE — PROGRESS NOTE ADULT - SUBJECTIVE AND OBJECTIVE BOX
56 y. o. patient with new onset paraplegia needs to go to the OR for neck stabilization. He is ETOH intoxicated and unable to sign. Taking in account the urgency of the patient's condition, the decision was made to proceed with surgery without consents.

## 2022-08-04 NOTE — BRIEF OPERATIVE NOTE - NSICDXBRIEFPREOP_GEN_ALL_CORE_FT
PRE-OP DIAGNOSIS:  Cervical spine fracture 04-Aug-2022 16:34:07 with cord compression Jamin Olivera  
PRE-OP DIAGNOSIS:  Cervical spine fracture 04-Aug-2022 16:34:07 with cord compression Jamin Olivera
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
## 2022-08-05 LAB
ANION GAP SERPL CALC-SCNC: 11 MMOL/L — SIGNIFICANT CHANGE UP (ref 5–17)
BASOPHILS # BLD AUTO: 0.05 K/UL — SIGNIFICANT CHANGE UP (ref 0–0.2)
BASOPHILS NFR BLD AUTO: 0.4 % — SIGNIFICANT CHANGE UP (ref 0–2)
BUN SERPL-MCNC: 9.7 MG/DL — SIGNIFICANT CHANGE UP (ref 8–20)
CALCIUM SERPL-MCNC: 7.6 MG/DL — LOW (ref 8.4–10.5)
CHLORIDE SERPL-SCNC: 106 MMOL/L — SIGNIFICANT CHANGE UP (ref 98–107)
CO2 SERPL-SCNC: 24 MMOL/L — SIGNIFICANT CHANGE UP (ref 22–29)
CREAT SERPL-MCNC: 0.55 MG/DL — SIGNIFICANT CHANGE UP (ref 0.5–1.3)
EGFR: 115 ML/MIN/1.73M2 — SIGNIFICANT CHANGE UP
EOSINOPHIL # BLD AUTO: 0 K/UL — SIGNIFICANT CHANGE UP (ref 0–0.5)
EOSINOPHIL NFR BLD AUTO: 0 % — SIGNIFICANT CHANGE UP (ref 0–6)
GLUCOSE SERPL-MCNC: 115 MG/DL — HIGH (ref 70–99)
HCT VFR BLD CALC: 35.4 % — LOW (ref 39–50)
HCV AB S/CO SERPL IA: 0.1 S/CO — SIGNIFICANT CHANGE UP (ref 0–0.99)
HCV AB SERPL-IMP: SIGNIFICANT CHANGE UP
HGB BLD-MCNC: 12.3 G/DL — LOW (ref 13–17)
IMM GRANULOCYTES NFR BLD AUTO: 0.7 % — SIGNIFICANT CHANGE UP (ref 0–1.5)
LYMPHOCYTES # BLD AUTO: 1.72 K/UL — SIGNIFICANT CHANGE UP (ref 1–3.3)
LYMPHOCYTES # BLD AUTO: 12.3 % — LOW (ref 13–44)
MAGNESIUM SERPL-MCNC: 2.1 MG/DL — SIGNIFICANT CHANGE UP (ref 1.6–2.6)
MANUAL SMEAR VERIFICATION: SIGNIFICANT CHANGE UP
MCHC RBC-ENTMCNC: 31.5 PG — SIGNIFICANT CHANGE UP (ref 27–34)
MCHC RBC-ENTMCNC: 34.7 GM/DL — SIGNIFICANT CHANGE UP (ref 32–36)
MCV RBC AUTO: 90.8 FL — SIGNIFICANT CHANGE UP (ref 80–100)
MONOCYTES # BLD AUTO: 1.73 K/UL — HIGH (ref 0–0.9)
MONOCYTES NFR BLD AUTO: 12.3 % — SIGNIFICANT CHANGE UP (ref 2–14)
NEUTROPHILS # BLD AUTO: 10.43 K/UL — HIGH (ref 1.8–7.4)
NEUTROPHILS NFR BLD AUTO: 74.3 % — SIGNIFICANT CHANGE UP (ref 43–77)
PHOSPHATE SERPL-MCNC: 1.6 MG/DL — LOW (ref 2.4–4.7)
PLAT MORPH BLD: NORMAL — SIGNIFICANT CHANGE UP
PLATELET # BLD AUTO: 193 K/UL — SIGNIFICANT CHANGE UP (ref 150–400)
POTASSIUM SERPL-MCNC: 4 MMOL/L — SIGNIFICANT CHANGE UP (ref 3.5–5.3)
POTASSIUM SERPL-SCNC: 4 MMOL/L — SIGNIFICANT CHANGE UP (ref 3.5–5.3)
RBC # BLD: 3.9 M/UL — LOW (ref 4.2–5.8)
RBC # FLD: 11.9 % — SIGNIFICANT CHANGE UP (ref 10.3–14.5)
RBC BLD AUTO: NORMAL — SIGNIFICANT CHANGE UP
SODIUM SERPL-SCNC: 140 MMOL/L — SIGNIFICANT CHANGE UP (ref 135–145)
WBC # BLD: 14.03 K/UL — HIGH (ref 3.8–10.5)
WBC # FLD AUTO: 14.03 K/UL — HIGH (ref 3.8–10.5)

## 2022-08-05 PROCEDURE — 71045 X-RAY EXAM CHEST 1 VIEW: CPT | Mod: 26

## 2022-08-05 PROCEDURE — 93010 ELECTROCARDIOGRAM REPORT: CPT

## 2022-08-05 PROCEDURE — 99222 1ST HOSP IP/OBS MODERATE 55: CPT

## 2022-08-05 PROCEDURE — 99291 CRITICAL CARE FIRST HOUR: CPT

## 2022-08-05 RX ORDER — HYDROMORPHONE HYDROCHLORIDE 2 MG/ML
0.5 INJECTION INTRAMUSCULAR; INTRAVENOUS; SUBCUTANEOUS EVERY 4 HOURS
Refills: 0 | Status: DISCONTINUED | OUTPATIENT
Start: 2022-08-05 | End: 2022-08-11

## 2022-08-05 RX ORDER — PHENYLEPHRINE HYDROCHLORIDE 10 MG/ML
0.1 INJECTION INTRAVENOUS
Qty: 40 | Refills: 0 | Status: DISCONTINUED | OUTPATIENT
Start: 2022-08-05 | End: 2022-08-07

## 2022-08-05 RX ORDER — HYDROMORPHONE HYDROCHLORIDE 2 MG/ML
0.5 INJECTION INTRAMUSCULAR; INTRAVENOUS; SUBCUTANEOUS EVERY 6 HOURS
Refills: 0 | Status: DISCONTINUED | OUTPATIENT
Start: 2022-08-05 | End: 2022-08-08

## 2022-08-05 RX ORDER — THIAMINE MONONITRATE (VIT B1) 100 MG
500 TABLET ORAL EVERY 8 HOURS
Refills: 0 | Status: COMPLETED | OUTPATIENT
Start: 2022-08-05 | End: 2022-08-08

## 2022-08-05 RX ORDER — METHOCARBAMOL 500 MG/1
500 TABLET, FILM COATED ORAL EVERY 8 HOURS
Refills: 0 | Status: DISCONTINUED | OUTPATIENT
Start: 2022-08-05 | End: 2022-08-19

## 2022-08-05 RX ORDER — FOLIC ACID 0.8 MG
1 TABLET ORAL DAILY
Refills: 0 | Status: DISCONTINUED | OUTPATIENT
Start: 2022-08-05 | End: 2022-08-19

## 2022-08-05 RX ORDER — SENNA PLUS 8.6 MG/1
1 TABLET ORAL AT BEDTIME
Refills: 0 | Status: DISCONTINUED | OUTPATIENT
Start: 2022-08-05 | End: 2022-08-09

## 2022-08-05 RX ORDER — OXYCODONE HYDROCHLORIDE 5 MG/1
10 TABLET ORAL EVERY 6 HOURS
Refills: 0 | Status: DISCONTINUED | OUTPATIENT
Start: 2022-08-05 | End: 2022-08-07

## 2022-08-05 RX ORDER — SODIUM CHLORIDE 9 MG/ML
1000 INJECTION INTRAMUSCULAR; INTRAVENOUS; SUBCUTANEOUS ONCE
Refills: 0 | Status: COMPLETED | OUTPATIENT
Start: 2022-08-05 | End: 2022-08-05

## 2022-08-05 RX ORDER — POLYETHYLENE GLYCOL 3350 17 G/17G
17 POWDER, FOR SOLUTION ORAL
Refills: 0 | Status: DISCONTINUED | OUTPATIENT
Start: 2022-08-05 | End: 2022-08-09

## 2022-08-05 RX ORDER — ENOXAPARIN SODIUM 100 MG/ML
40 INJECTION SUBCUTANEOUS EVERY 24 HOURS
Refills: 0 | Status: DISCONTINUED | OUTPATIENT
Start: 2022-08-05 | End: 2022-08-19

## 2022-08-05 RX ORDER — OXYCODONE HYDROCHLORIDE 5 MG/1
5 TABLET ORAL EVERY 8 HOURS
Refills: 0 | Status: DISCONTINUED | OUTPATIENT
Start: 2022-08-05 | End: 2022-08-07

## 2022-08-05 RX ADMIN — OXYCODONE HYDROCHLORIDE 10 MILLIGRAM(S): 5 TABLET ORAL at 22:40

## 2022-08-05 RX ADMIN — HYDROMORPHONE HYDROCHLORIDE 0.5 MILLIGRAM(S): 2 INJECTION INTRAMUSCULAR; INTRAVENOUS; SUBCUTANEOUS at 09:18

## 2022-08-05 RX ADMIN — HYDROMORPHONE HYDROCHLORIDE 0.5 MILLIGRAM(S): 2 INJECTION INTRAMUSCULAR; INTRAVENOUS; SUBCUTANEOUS at 16:06

## 2022-08-05 RX ADMIN — Medication 105 MILLIGRAM(S): at 21:03

## 2022-08-05 RX ADMIN — HYDROMORPHONE HYDROCHLORIDE 0.5 MILLIGRAM(S): 2 INJECTION INTRAMUSCULAR; INTRAVENOUS; SUBCUTANEOUS at 16:19

## 2022-08-05 RX ADMIN — METHOCARBAMOL 500 MILLIGRAM(S): 500 TABLET, FILM COATED ORAL at 21:03

## 2022-08-05 RX ADMIN — ENOXAPARIN SODIUM 40 MILLIGRAM(S): 100 INJECTION SUBCUTANEOUS at 21:04

## 2022-08-05 RX ADMIN — Medication 105 MILLIGRAM(S): at 13:03

## 2022-08-05 RX ADMIN — Medication 400 MILLIGRAM(S): at 11:15

## 2022-08-05 RX ADMIN — OXYCODONE HYDROCHLORIDE 10 MILLIGRAM(S): 5 TABLET ORAL at 21:40

## 2022-08-05 RX ADMIN — HYDROMORPHONE HYDROCHLORIDE 0.5 MILLIGRAM(S): 2 INJECTION INTRAMUSCULAR; INTRAVENOUS; SUBCUTANEOUS at 09:04

## 2022-08-05 RX ADMIN — SODIUM CHLORIDE 1000 MILLILITER(S): 9 INJECTION INTRAMUSCULAR; INTRAVENOUS; SUBCUTANEOUS at 11:13

## 2022-08-05 RX ADMIN — Medication 100 MILLIGRAM(S): at 05:52

## 2022-08-05 RX ADMIN — Medication 1 TABLET(S): at 11:14

## 2022-08-05 RX ADMIN — Medication 1 MILLIGRAM(S): at 11:14

## 2022-08-05 RX ADMIN — CHLORHEXIDINE GLUCONATE 1 APPLICATION(S): 213 SOLUTION TOPICAL at 05:53

## 2022-08-05 RX ADMIN — Medication 400 MILLIGRAM(S): at 05:52

## 2022-08-05 RX ADMIN — METHOCARBAMOL 500 MILLIGRAM(S): 500 TABLET, FILM COATED ORAL at 13:03

## 2022-08-05 RX ADMIN — CHLORHEXIDINE GLUCONATE 1 APPLICATION(S): 213 SOLUTION TOPICAL at 11:13

## 2022-08-05 NOTE — OCCUPATIONAL THERAPY INITIAL EVALUATION ADULT - ADDITIONAL COMMENTS
Pt has been living in a group home past year.  There are 4 steps to enter with 2 wideset HRs.  Shared kitchen and bathroom on main level.  He has his own bedroom on 2nd level, up flight of 12 steps with 1 HR.  There is a shared shower seat available that he can use.  Pt has no other medical equipment.

## 2022-08-05 NOTE — PROGRESS NOTE ADULT - SUBJECTIVE AND OBJECTIVE BOX
Ramirez was awake and alert laying flat in bed as I fit his B/L UE with resting hand braces, and his B/L LE with PRAFO's. Socks were provided for both sets of devices for use as interface material. I also removed current cervical collar and applied an Aspen Multipost cervical brace. Additional liners were provided for washing and changing. Velcro straps were marked to help maintain brace in position under his chin. Chin section can be lowered if necessary when he is upright by pulling yellow disk forward and rotating it counter clock wise. All braces should be wiped clean daily.   [facility]  [phone]

## 2022-08-05 NOTE — OCCUPATIONAL THERAPY INITIAL EVALUATION ADULT - IMPAIRED TRANSFERS: BED/CHAIR, REHAB EVAL
impaired balance/impaired coordination/impaired motor control/abnormal muscle tone/pain/impaired postural control/decreased ROM/decreased sensation/decreased strength

## 2022-08-05 NOTE — CHART NOTE - NSCHARTNOTEFT_GEN_A_CORE
SICU TRANSFER NOTE  -----------------------------  ICU Admission Date: XXXXX  Transfer Date: 08-05-22 @ 07:47    Admission Diagnosis:   Complex fracture dislocation at C6-C7    Active Problems/injuries:   Complex fracture dislocation at C6-C7    Procedures:   8/4 C6-7 ACIDF and Posterior cervical and thoracic decompression and fusion of C5-T2    Consultants:  [ ] Cardiology  [ ] Endocrine  [ ] Infectious Disease  [ ] Medicine  [x]Neurosurgery  [ ] Ortho       [ ] Weight Bearing Status:  [ ] Palliative       [ ] Advanced Directives:    [ ] Physical Medicine and Rehab       [ ] Disposition :   [ ] Plastics  [ ] Pulmonary    Medications  acetaminophen   IVPB .. 1000 milliGRAM(s) IV Intermittent every 6 hours  chlorhexidine 2% Cloths 1 Application(s) Topical daily  chlorhexidine 4% Liquid 1 Application(s) Topical <User Schedule>  multiple electrolytes Injection Type 1 1000 milliLiter(s) IV Continuous <Continuous>  oxyCODONE    IR 5 milliGRAM(s) Oral every 4 hours PRN  oxyCODONE    IR 10 milliGRAM(s) Oral every 4 hours PRN  phenylephrine    Infusion 0.1 MICROgram(s)/kG/Min IV Continuous <Continuous>  sodium chloride 0.9% lock flush 10 milliLiter(s) IV Push every 1 hour PRN      [x] I attest I have reviewed and reconciled all medications prior to transfer    IV Fluids  sodium chloride 0.9% Bolus:   2000 milliLiter(s), IV Bolus, once, infuse over 60 Minute(s), Stop After 1 Doses    Indication: NPO    Antibiotics: None    Patient admitted to SICU after traumatic C6-7 complex fracture with neurological deficits. Patient underwent emergent MRI and then surgical decompression and fixation by neurosurgery on 8/4. After surgery the patient was transferred back to SICU on Brenden that was able to be discontinued at 5:00 maintaining MAP >80 and remained HDN stable. Patient failed S/S evaluation and is NPO with IVF at the moment. Remains with C-collar in place with weak  strength. anterior Hemovac and posterior ARUN in place.   Patient will be transferred to NSx service for post-operative care and dispo.   I have discussed this case with Neurosurgery team upon transfer and all questions regarding ICU course were answered.  The following items are to be followed up:  -Re-eval Swallow eval  -Timing of DVT pxx  -Q1 neurochecks  -PT/OT when cleared  -Monitor urine output and remove the [place] when possible  -Continue CIWA protocol SICU TRANSFER NOTE  -----------------------------  ICU Admission Date: 8/4/22  Transfer Date: 08-05-22 @ 07:47    Admission Diagnosis:   Complex fracture dislocation at C6-C7    Active Problems/injuries:   Complex fracture dislocation at C6-C7    Procedures:   8/4 C6-7 ACIDF and Posterior cervical and thoracic decompression and fusion of C5-T2    Consultants:  [ ] Cardiology  [ ] Endocrine  [ ] Infectious Disease  [ ] Medicine  [x]Neurosurgery  [ ] Ortho       [ ] Weight Bearing Status:  [ ] Palliative       [ ] Advanced Directives:    [ ] Physical Medicine and Rehab       [ ] Disposition :   [ ] Plastics  [ ] Pulmonary    Medications  acetaminophen   IVPB .. 1000 milliGRAM(s) IV Intermittent every 6 hours  chlorhexidine 2% Cloths 1 Application(s) Topical daily  chlorhexidine 4% Liquid 1 Application(s) Topical <User Schedule>  multiple electrolytes Injection Type 1 1000 milliLiter(s) IV Continuous <Continuous>  oxyCODONE    IR 5 milliGRAM(s) Oral every 4 hours PRN  oxyCODONE    IR 10 milliGRAM(s) Oral every 4 hours PRN  phenylephrine    Infusion 0.1 MICROgram(s)/kG/Min IV Continuous <Continuous>  sodium chloride 0.9% lock flush 10 milliLiter(s) IV Push every 1 hour PRN      [x] I attest I have reviewed and reconciled all medications prior to transfer    IV Fluids  sodium chloride 0.9% Bolus:   2000 milliLiter(s), IV Bolus, once, infuse over 60 Minute(s), Stop After 1 Doses    Indication: NPO    Antibiotics: None    Patient admitted to SICU after traumatic C6-7 complex fracture with neurological deficits. Patient underwent emergent MRI and then surgical decompression and fixation by neurosurgery on 8/4. After surgery the patient was transferred back to SICU on Brenden that was able to be discontinued at 5:00 maintaining MAP >80 and remained HDN stable. Patient failed S/S evaluation and is NPO with IVF at the moment. Remains with C-collar in place with weak  strength. anterior Hemovac and posterior ARUN in place.   Patient will be transferred to NSx service for post-operative care and dispo.   I have discussed this case with Neurosurgery team upon transfer and all questions regarding ICU course were answered.  The following items are to be followed up:  -Re-eval Swallow eval  -Timing of DVT pxx  -Q1 neurochecks  -PT/OT when cleared  -Monitor urine output and remove the [place] when possible  -Continue CIWA protocol  -All scans and imaging reviewed, no evidence of other traumatic injuries. Patient is cleared from a trauma perspective to be transferred to neurosurgery

## 2022-08-05 NOTE — SWALLOW BEDSIDE ASSESSMENT ADULT - SWALLOW EVAL: DIAGNOSIS
Oral stage grossly functional for administered consistencies. Suspect pharyngeal dysphagia w/ soft & bite-sized due to multiple swallows x3 & mild increased WOB post swallow & w/ administered liquids due to multiple swallows & +cough. Pharyngeal stage deemed WFL for puree with no overt s/s aspiration noted.

## 2022-08-05 NOTE — PHYSICAL THERAPY INITIAL EVALUATION ADULT - CRITERIA FOR SKILLED THERAPEUTIC INTERVENTIONS
inpatient rehabilitation, final recommendations pending progress/impairments found/rehab potential/anticipated equipment needs at discharge/anticipated discharge recommendation

## 2022-08-05 NOTE — PHYSICAL THERAPY INITIAL EVALUATION ADULT - SITTING BALANCE: DYNAMIC
Pt unable to maintain sitting balance, required Max assist to maintain upright position./poor balance

## 2022-08-05 NOTE — PHYSICAL THERAPY INITIAL EVALUATION ADULT - ADDITIONAL COMMENTS
Pt lives in a group home with other roommates and landlord (No one can assist pt) 4 steps to enter with wide bilateral handrails, 12 steps inside with 1 handrail to pt's bedroom. Pt was independent PTA without an assist device. Pt owns no DME.

## 2022-08-05 NOTE — SWALLOW BEDSIDE ASSESSMENT ADULT - SLP GENERAL OBSERVATIONS
Pt received A&A in bed, Ox3-4, +c-collar, +baseline cough, tolerating RA, pt reporting some discomfort/ numbness to hands & discomfort to neck but 0/10 pain RN aware

## 2022-08-05 NOTE — SWALLOW BEDSIDE ASSESSMENT ADULT - COMMENTS
24h Events:  Pt underwent cervical spine fusion with both anterior and posterior approach. Returned to unit hemodynamically well. Remains on pressors to maintain MAP > 80 with the goal of increasing spinal perfusion.

## 2022-08-05 NOTE — PHYSICAL THERAPY INITIAL EVALUATION ADULT - MANUAL MUSCLE TESTING RESULTS, REHAB EVAL
BLEs 0/5 throughout, BUEs grossly 2-/5 in elbow/shoulder flexion, 1+/5 bilateral tricep extension. BLEs 0/5 throughout, BUEs grossly 2-/5 in elbow/shoulder flexion, 3/5 bilateral tricep extension.

## 2022-08-05 NOTE — CONSULT NOTE ADULT - SUBJECTIVE AND OBJECTIVE BOX
56yM was admitted on     Patient is a 56y old  Male who presents with a chief complaint of Complex fracture dislocation at C6-C7 (05 Aug 2022 10:45)    HPI: Mr. Ely is a 56 year old male who presented to the ED by EMS with B/L LE motor and sensory loss, per reports, he was wrestling with his friends.  He was unable to feel or move his lower extremities, he can move his arms and his wrist but not his fingers.  CT showed Complex fracture dislocation at C6-C7 with bilateral jumped facets and traumatic grade 2 anterolisthesis.  He is s/p C6-7 ACDF and C5-T2 posterior instrumented fusion.  He reports he does have sensation, but decreased in his legs.  Reports no movement in his legs.         Imaging reviewed showed:  ACC: [id number] EXAM:  MR SPINE CERVICAL                          PROCEDURE DATE:  2022          INTERPRETATION:  Exam: MR Cervical Spine Without Contrast  Exam date and time: 2022 1:40 AM  Age: 56 years old Clinical indication: Neck pain    TECHNIQUE:  Imaging protocol: Magnetic resonance imaging of the cervical spine   without contrast.    COMPARISON: CT CERVICAL SPINE 2022 12:13 AM    FINDINGS:    Bones/joints: Comminuted displaced fracture of the left C6 inferior   facet. Posterior epidural hemorrhage at the C7 and T1 levels best seen on   image 8 of series 6 and image 29 of series 9.  Bilateral jumped facets at the C6-C7. Grade 2 anterolisthesis at C6-C7   measuring 7 mm. Mild compression fracture of the anterior superior   endplate of C7/marginal osteophyte. Retropulsion of C7. Focal kyphosis at   C6-C7 with severe widening of the C6-C7 inter spinous distance.   Discontinuity of the inter spinous ligament. Severe bilateral paraspinal   soft tissue edema. Ligamentous disruption at C6-C7. Moderate spinal canal   stenosis. Moderate compression of the cord at C6-C7. Motion artifact   limits evaluation of the axial T2 images but increased T2 signal of the   cord at C6-C7 seen on the sagittal images. No definitive visualized   hemorrhage of the cord.    Vasculature: Expected flow voids in the vertebral arteries.  Soft tissues: Moderate prevertebral soft tissue edema/fluid/hemorrhage.   Severe posterior paraspinal muscle edema, diffuse.      IMPRESSION:    1. Comminuted displaced fracture of the left C6 inferior facet. Bilateral   jumped facets at the C6-C7.  2. Grade 2 anterolisthesis at C6-C7 measuring 7 mm. Posterior epidural   hemorrhage at the C7 and T1 levels.  3. Mild compression fracture of the anterior superior endplate of   C7/marginal osteophyte.  4. Retropulsion of C7.  5. Focal kyphosis at C6-C7 with severe widening of the C6-C7 inter   spinous distance. Discontinuity of the inter spinous ligament and   ligamentum flavum at C6-C7.  6. Moderate spinal canal stenosis. Moderate compression of the cord at   C6-C7.  Motion artifact limits evaluation of the axial T2 images but increased T2   signal of the cord at C6-C7 seen on the sagittal images. No definitive   visualized hemorrhage of the cord.        REVIEW OF SYSTEMS  Constitutional - No fever, No weight loss, No fatigue  HEENT - No eye pain, No visual disturbances, No difficulty hearing, No tinnitus, No vertigo, No neck pain  Respiratory - No cough, No wheezing, No shortness of breath  Cardiovascular - No chest pain, No palpitations  Gastrointestinal - No abdominal pain, No nausea, No vomiting, No diarrhea, No constipation  Genitourinary - No dysuria, No frequency, No hematuria, No incontinence  Neurological - No headaches, No memory loss, + loss of strength, + numbness, No tremors  Skin - No itching, No rashes, No lesions   Endocrine - No temperature intolerance  Musculoskeletal -+  joint pain, No joint swelling, No muscle pain  Psychiatric - No depression, No anxiety    VITALS  T(C): 37.4 (22 @ 11:29), Max: 37.6 (22 @ 07:26)  HR: 83 (22 @ 14:00) (75 - 96)  BP: 127/66 (22 @ 08:00) (119/73 - 146/84)  RR: 18 (22 @ 14:00) (15 - 28)  SpO2: 96% (22 @ 14:00) (91% - 98%)  Wt(kg): --    PAST MEDICAL & SURGICAL HISTORY  No pertinent past medical history    No significant past surgical history        SOCIAL HISTORY - as per documentation/history  Smoking - None  EtOH - None  Drugs - None    FUNCTIONAL HISTORY  Previously independent.  Lives on the second floor of a home.      CURRENT FUNCTIONAL STATUS    Bed Mobility: Rolling/Turning:     · Level of Daisy	maximum assist (25% patients effort)  · Physical Assist/Nonphysical Assist	2 person assist    Bed Mobility: Sit to Supine:     · Level of Daisy	dependent (less than 25% patients effort)  · Physical Assist/Nonphysical Assist	2 person assist    Bed Mobility: Supine to Sit:     · Level of Daisy	maximum assist (25% patients effort)  · Physical Assist/Nonphysical Assist	2 person assist    Bed Mobility Analysis:     · Bed Mobility Limitations	decreased ability to use legs for bridging/pushing; impaired ability to control trunk for mobility; decreased ability to use arms for pushing/pulling  · Impairments Contributing to Impaired Bed Mobility	impaired balance; impaired postural control; decreased strength; pain; impaired coordination    Transfer: Bed to Chair:     Transfer Skill: Bed to Chair   · Level of Daisy	unable to perform; unsafe at this time, recommend buffy lift OOB daily with nursing staff.    Transfer: Sit to Stand:     · Level of Daisy	unable to perform; unsafe at this time, recommend buffy lift OOB daily with nursing staff.    Gait Skills:     · Level of Daisy	unable to perform; unsafe at this time, recommend buffy lift OOB daily with nursing staff.    Balance Skills Assessment:     · Sitting Balance: Static	poor balance  · Sitting Balance: Dynamic	poor balance  Pt unable to maintain sitting balance, required Max assist to maintain upright position.      FAMILY HISTORY   No pertinent family history in first degree relatives        RECENT LABS - Reviewed  CBC Full  -  ( 05 Aug 2022 05:00 )  WBC Count : 14.03 K/uL  RBC Count : 3.90 M/uL  Hemoglobin : 12.3 g/dL  Hematocrit : 35.4 %  Platelet Count - Automated : 193 K/uL  Mean Cell Volume : 90.8 fl  Mean Cell Hemoglobin : 31.5 pg  Mean Cell Hemoglobin Concentration : 34.7 gm/dL  Auto Neutrophil # : 10.43 K/uL  Auto Lymphocyte # : 1.72 K/uL  Auto Monocyte # : 1.73 K/uL  Auto Eosinophil # : 0.00 K/uL  Auto Basophil # : 0.05 K/uL  Auto Neutrophil % : 74.3 %  Auto Lymphocyte % : 12.3 %  Auto Monocyte % : 12.3 %  Auto Eosinophil % : 0.0 %  Auto Basophil % : 0.4 %        140  |  106  |  9.7  ----------------------------<  115<H>  4.0   |  24.0  |  0.55    Ca    7.6<L>      05 Aug 2022 05:00  Phos  1.6     08-05  Mg     2.1     08-05    TPro  6.1<L>  /  Alb  3.4  /  TBili  0.4  /  DBili  0.1  /  AST  97<H>  /  ALT  50<H>  /  AlkPhos  71  08-04    Urinalysis Basic - ( 04 Aug 2022 07:00 )    Color: Yellow / Appearance: Clear / S.020 / pH: x  Gluc: x / Ketone: Trace  / Bili: Negative / Urobili: Negative mg/dL   Blood: x / Protein: Negative / Nitrite: Negative   Leuk Esterase: Negative / RBC: 0-2 /HPF / WBC 0-2 /HPF   Sq Epi: x / Non Sq Epi: Occasional / Bacteria: Negative        ALLERGIES  No Known Allergies      MEDICATIONS   chlorhexidine 2% Cloths 1 Application(s) Topical daily  chlorhexidine 4% Liquid 1 Application(s) Topical <User Schedule>  enoxaparin Injectable 40 milliGRAM(s) SubCutaneous every 24 hours  folic acid 1 milliGRAM(s) Oral daily  HYDROmorphone  Injectable 0.5 milliGRAM(s) IV Push every 4 hours PRN  HYDROmorphone  Injectable 0.5 milliGRAM(s) IV Push every 6 hours PRN  methocarbamol 500 milliGRAM(s) Oral every 8 hours  multiple electrolytes Injection Type 1 1000 milliLiter(s) IV Continuous <Continuous>  multivitamin 1 Tablet(s) Oral daily  oxyCODONE    IR 5 milliGRAM(s) Oral every 8 hours PRN  oxyCODONE    IR 10 milliGRAM(s) Oral every 6 hours PRN  phenylephrine    Infusion 0.1 MICROgram(s)/kG/Min IV Continuous <Continuous>  polyethylene glycol 3350 17 Gram(s) Oral two times a day  senna 1 Tablet(s) Oral at bedtime  sodium chloride 0.9% lock flush 10 milliLiter(s) IV Push every 1 hour PRN  thiamine IVPB 500 milliGRAM(s) IV Intermittent every 8 hours      ----------------------------------------------------------------------------------------  PHYSICAL EXAM  Constitutional - NAD, Comfortable  HEENT - EOMI   Neck - Cervical collar in place   Chest - Breathing comfortably, No wheezing  Cardiovascular - No cyanosis    Abdomen - Soft   Extremities - No C/C/E, No calf tenderness   Neurologic Exam -                    Cognitive - Awake, Alert, AAO to self, place, date, year, situation     Communication - Fluent, No dysarthria     Cranial Nerves - CN 2-12 intact     Motor - No focal deficits                    LEFT    UE - ShAB 5/5, EF 5/5, EE 5/5, WE 5/5, Finger flexion 1/5, Finger abduction 1/5                     RIGHT UE - ShAB 5/5, EF 5/5, EE 5/5, WE 5/5, Finger flexion 1/5, Finger abduction 1/5                     LEFT    LE - HF 0/5, KE 0/5, DF 0/5, PF 0/5                    RIGHT LE - HF 0/5, KE 0/5, DF 0/5, PF 0/5        Sensory - Intact to LT up to C7, sensation intact but diminished below C7 level      Reflexes - No clonus   Psychiatric - Mood stable, Affect WNL  ----------------------------------------------------------------------------------------  ASSESSMENT/PLAN  56yMale with functional deficits after C6-7 fracture/dislocation s/p C6-7 ACDF and C5-T2 posterior instrumented fusion   s/p C6-7 ACDF and C5-T2 posterior instrumented fusion -  Aspen collar   Pain - Tylenol, oxycodone  Monitor for neurogenic bladder - [place] catheter   Monitor for neurogenic bowel- consider bisacodyl suppository if no bowel movement   DVT PPX - SCDs, lovenox   Rehab - Continue PT/OT.  Medically being optimized.  Recommend acute rehabilitation if he has appropriate support at discharge and accessible living situation.  Appreciate  efforts.        Will continue to follow. Functional progress will determine ongoing rehab dispo recommendations, which may change.    Continue bedside therapy as well as OOB throughout the day with mobilization by staff to maintain cardiopulmonary function and prevention of secondary complications related to debility.
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
## 2022-08-05 NOTE — OCCUPATIONAL THERAPY INITIAL EVALUATION ADULT - ANTICIPATED DISCHARGE DISPOSITION, OT EVAL
Rehab pending progress and collaboration with interdisciplinary care team.  PM&R consult recommended.
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
## 2022-08-05 NOTE — SWALLOW BEDSIDE ASSESSMENT ADULT - SWALLOW EVAL: RECOMMENDED FEEDING/EATING TECHNIQUES
small bites/crush medication (when feasible)/maintain upright posture during/after eating for 30 mins/oral hygiene/position upright (90 degrees)
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
## 2022-08-05 NOTE — PHYSICAL THERAPY INITIAL EVALUATION ADULT - RANGE OF MOTION EXAMINATION, REHAB EVAL
BUEs tested to 90 degrees flexion only./bilateral upper extremity ROM was WFL (within functional limits)
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
## 2022-08-05 NOTE — PHYSICAL THERAPY INITIAL EVALUATION ADULT - GENERAL OBSERVATIONS, REHAB EVAL
Pt received supine in bed, + C-collar + ARUN drain + Hemovac + telemetry//BP + IV + [place] with BP. C/o 0/10 pre and Post PT pain. Pt agreeable to PT

## 2022-08-05 NOTE — SWALLOW BEDSIDE ASSESSMENT ADULT - PHARYNGEAL PHASE
Within functional limits x3, mild increased WOB post swallow/Multiple swallows x2; +immediate cough in 3/5 trials/Multiple swallows x2; cough in 2/3 trials, SpO2 decreased to 89% post swallow/ while coughing/Multiple swallows

## 2022-08-06 LAB
ANION GAP SERPL CALC-SCNC: 10 MMOL/L — SIGNIFICANT CHANGE UP (ref 5–17)
BASE EXCESS BLDA CALC-SCNC: 0.7 MMOL/L — SIGNIFICANT CHANGE UP (ref -2–3)
BLOOD GAS COMMENTS ARTERIAL: SIGNIFICANT CHANGE UP
BUN SERPL-MCNC: 7.9 MG/DL — LOW (ref 8–20)
CALCIUM SERPL-MCNC: 7.4 MG/DL — LOW (ref 8.4–10.5)
CHLORIDE SERPL-SCNC: 102 MMOL/L — SIGNIFICANT CHANGE UP (ref 98–107)
CO2 SERPL-SCNC: 23 MMOL/L — SIGNIFICANT CHANGE UP (ref 22–29)
CREAT SERPL-MCNC: 0.6 MG/DL — SIGNIFICANT CHANGE UP (ref 0.5–1.3)
EGFR: 112 ML/MIN/1.73M2 — SIGNIFICANT CHANGE UP
GAS PNL BLDA: SIGNIFICANT CHANGE UP
GLUCOSE SERPL-MCNC: 98 MG/DL — SIGNIFICANT CHANGE UP (ref 70–99)
GRAM STN FLD: SIGNIFICANT CHANGE UP
HCO3 BLDA-SCNC: 23 MMOL/L — SIGNIFICANT CHANGE UP (ref 21–28)
HCT VFR BLD CALC: 31 % — LOW (ref 39–50)
HGB BLD-MCNC: 11.3 G/DL — LOW (ref 13–17)
HOROWITZ INDEX BLDA+IHG-RTO: 21 — SIGNIFICANT CHANGE UP
MAGNESIUM SERPL-MCNC: 2 MG/DL — SIGNIFICANT CHANGE UP (ref 1.6–2.6)
MCHC RBC-ENTMCNC: 32.9 PG — SIGNIFICANT CHANGE UP (ref 27–34)
MCHC RBC-ENTMCNC: 36.5 GM/DL — HIGH (ref 32–36)
MCV RBC AUTO: 90.4 FL — SIGNIFICANT CHANGE UP (ref 80–100)
PCO2 BLDA: 28 MMHG — LOW (ref 35–48)
PH BLDA: 7.53 — HIGH (ref 7.35–7.45)
PHOSPHATE SERPL-MCNC: 1.2 MG/DL — LOW (ref 2.4–4.7)
PLATELET # BLD AUTO: 143 K/UL — LOW (ref 150–400)
PO2 BLDA: 72 MMHG — LOW (ref 83–108)
POTASSIUM SERPL-MCNC: 3.6 MMOL/L — SIGNIFICANT CHANGE UP (ref 3.5–5.3)
POTASSIUM SERPL-SCNC: 3.6 MMOL/L — SIGNIFICANT CHANGE UP (ref 3.5–5.3)
RBC # BLD: 3.43 M/UL — LOW (ref 4.2–5.8)
RBC # FLD: 11.8 % — SIGNIFICANT CHANGE UP (ref 10.3–14.5)
SAO2 % BLDA: 98.3 % — HIGH (ref 94–98)
SODIUM SERPL-SCNC: 135 MMOL/L — SIGNIFICANT CHANGE UP (ref 135–145)
SPECIMEN SOURCE: SIGNIFICANT CHANGE UP
WBC # BLD: 10.45 K/UL — SIGNIFICANT CHANGE UP (ref 3.8–10.5)
WBC # FLD AUTO: 10.45 K/UL — SIGNIFICANT CHANGE UP (ref 3.8–10.5)

## 2022-08-06 PROCEDURE — 99291 CRITICAL CARE FIRST HOUR: CPT

## 2022-08-06 RX ORDER — ACETYLCYSTEINE 200 MG/ML
4 VIAL (ML) MISCELLANEOUS ONCE
Refills: 0 | Status: COMPLETED | OUTPATIENT
Start: 2022-08-06 | End: 2022-08-06

## 2022-08-06 RX ORDER — ALPRAZOLAM 0.25 MG
0.5 TABLET ORAL EVERY 12 HOURS
Refills: 0 | Status: DISCONTINUED | OUTPATIENT
Start: 2022-08-06 | End: 2022-08-13

## 2022-08-06 RX ORDER — SODIUM,POTASSIUM PHOSPHATES 278-250MG
1 POWDER IN PACKET (EA) ORAL ONCE
Refills: 0 | Status: DISCONTINUED | OUTPATIENT
Start: 2022-08-06 | End: 2022-08-06

## 2022-08-06 RX ORDER — ROBINUL 0.2 MG/ML
0.2 INJECTION INTRAMUSCULAR; INTRAVENOUS ONCE
Refills: 0 | Status: COMPLETED | OUTPATIENT
Start: 2022-08-06 | End: 2022-08-06

## 2022-08-06 RX ORDER — CALCIUM GLUCONATE 100 MG/ML
1 VIAL (ML) INTRAVENOUS ONCE
Refills: 0 | Status: COMPLETED | OUTPATIENT
Start: 2022-08-06 | End: 2022-08-06

## 2022-08-06 RX ORDER — IPRATROPIUM/ALBUTEROL SULFATE 18-103MCG
3 AEROSOL WITH ADAPTER (GRAM) INHALATION ONCE
Refills: 0 | Status: COMPLETED | OUTPATIENT
Start: 2022-08-06 | End: 2022-08-06

## 2022-08-06 RX ORDER — LANOLIN ALCOHOL/MO/W.PET/CERES
5 CREAM (GRAM) TOPICAL AT BEDTIME
Refills: 0 | Status: DISCONTINUED | OUTPATIENT
Start: 2022-08-06 | End: 2022-08-19

## 2022-08-06 RX ORDER — ACETAMINOPHEN 500 MG
650 TABLET ORAL EVERY 6 HOURS
Refills: 0 | Status: DISCONTINUED | OUTPATIENT
Start: 2022-08-06 | End: 2022-08-19

## 2022-08-06 RX ORDER — ACETAMINOPHEN 500 MG
1000 TABLET ORAL ONCE
Refills: 0 | Status: COMPLETED | OUTPATIENT
Start: 2022-08-06 | End: 2022-08-06

## 2022-08-06 RX ORDER — ALPRAZOLAM 0.25 MG
0.25 TABLET ORAL ONCE
Refills: 0 | Status: DISCONTINUED | OUTPATIENT
Start: 2022-08-06 | End: 2022-08-06

## 2022-08-06 RX ORDER — DEXAMETHASONE 0.5 MG/5ML
4 ELIXIR ORAL EVERY 6 HOURS
Refills: 0 | Status: DISCONTINUED | OUTPATIENT
Start: 2022-08-06 | End: 2022-08-08

## 2022-08-06 RX ADMIN — Medication 400 MILLIGRAM(S): at 03:19

## 2022-08-06 RX ADMIN — SENNA PLUS 1 TABLET(S): 8.6 TABLET ORAL at 22:15

## 2022-08-06 RX ADMIN — Medication 100 GRAM(S): at 05:21

## 2022-08-06 RX ADMIN — Medication 105 MILLIGRAM(S): at 05:31

## 2022-08-06 RX ADMIN — Medication 4 MILLILITER(S): at 05:14

## 2022-08-06 RX ADMIN — Medication 105 MILLIGRAM(S): at 22:22

## 2022-08-06 RX ADMIN — PHENYLEPHRINE HYDROCHLORIDE 3.23 MICROGRAM(S)/KG/MIN: 10 INJECTION INTRAVENOUS at 23:28

## 2022-08-06 RX ADMIN — Medication 0.5 MILLIGRAM(S): at 23:23

## 2022-08-06 RX ADMIN — OXYCODONE HYDROCHLORIDE 10 MILLIGRAM(S): 5 TABLET ORAL at 07:50

## 2022-08-06 RX ADMIN — OXYCODONE HYDROCHLORIDE 10 MILLIGRAM(S): 5 TABLET ORAL at 19:17

## 2022-08-06 RX ADMIN — OXYCODONE HYDROCHLORIDE 10 MILLIGRAM(S): 5 TABLET ORAL at 18:17

## 2022-08-06 RX ADMIN — Medication 5 MILLIGRAM(S): at 22:15

## 2022-08-06 RX ADMIN — METHOCARBAMOL 500 MILLIGRAM(S): 500 TABLET, FILM COATED ORAL at 05:30

## 2022-08-06 RX ADMIN — Medication 4 MILLIGRAM(S): at 17:05

## 2022-08-06 RX ADMIN — Medication 600 MILLIGRAM(S): at 05:31

## 2022-08-06 RX ADMIN — Medication 4 MILLIGRAM(S): at 23:27

## 2022-08-06 RX ADMIN — POLYETHYLENE GLYCOL 3350 17 GRAM(S): 17 POWDER, FOR SOLUTION ORAL at 17:47

## 2022-08-06 RX ADMIN — Medication 600 MILLIGRAM(S): at 17:15

## 2022-08-06 RX ADMIN — CHLORHEXIDINE GLUCONATE 1 APPLICATION(S): 213 SOLUTION TOPICAL at 11:13

## 2022-08-06 RX ADMIN — METHOCARBAMOL 500 MILLIGRAM(S): 500 TABLET, FILM COATED ORAL at 14:04

## 2022-08-06 RX ADMIN — Medication 5 MILLIGRAM(S): at 00:37

## 2022-08-06 RX ADMIN — Medication 105 MILLIGRAM(S): at 14:04

## 2022-08-06 RX ADMIN — CHLORHEXIDINE GLUCONATE 1 APPLICATION(S): 213 SOLUTION TOPICAL at 05:33

## 2022-08-06 RX ADMIN — METHOCARBAMOL 500 MILLIGRAM(S): 500 TABLET, FILM COATED ORAL at 22:15

## 2022-08-06 RX ADMIN — Medication 1 TABLET(S): at 11:12

## 2022-08-06 RX ADMIN — OXYCODONE HYDROCHLORIDE 10 MILLIGRAM(S): 5 TABLET ORAL at 08:50

## 2022-08-06 RX ADMIN — ENOXAPARIN SODIUM 40 MILLIGRAM(S): 100 INJECTION SUBCUTANEOUS at 22:15

## 2022-08-06 RX ADMIN — Medication 1000 MILLIGRAM(S): at 04:16

## 2022-08-06 RX ADMIN — Medication 1 MILLIGRAM(S): at 11:12

## 2022-08-06 RX ADMIN — Medication 3 MILLILITER(S): at 05:14

## 2022-08-06 RX ADMIN — ROBINUL 0.2 MILLIGRAM(S): 0.2 INJECTION INTRAMUSCULAR; INTRAVENOUS at 17:15

## 2022-08-06 RX ADMIN — Medication 85 MILLIMOLE(S): at 05:31

## 2022-08-06 RX ADMIN — Medication 0.25 MILLIGRAM(S): at 00:36

## 2022-08-06 NOTE — DIETITIAN INITIAL EVALUATION ADULT - ORAL NUTRITION SUPPLEMENTS
Ensure Pudding TID to optimize po intake and provide an additional 170 kcal, 4g protein per serving.
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
## 2022-08-06 NOTE — DIETITIAN INITIAL EVALUATION ADULT - PERTINENT MEDS FT
MEDICATIONS  (STANDING):  chlorhexidine 2% Cloths 1 Application(s) Topical daily  chlorhexidine 4% Liquid 1 Application(s) Topical <User Schedule>  enoxaparin Injectable 40 milliGRAM(s) SubCutaneous every 24 hours  folic acid 1 milliGRAM(s) Oral daily  guaiFENesin  milliGRAM(s) Oral every 12 hours  melatonin 5 milliGRAM(s) Oral at bedtime  methocarbamol 500 milliGRAM(s) Oral every 8 hours  multiple electrolytes Injection Type 1 1000 milliLiter(s) (75 mL/Hr) IV Continuous <Continuous>  multivitamin 1 Tablet(s) Oral daily  phenylephrine    Infusion 0.1 MICROgram(s)/kG/Min (3.23 mL/Hr) IV Continuous <Continuous>  polyethylene glycol 3350 17 Gram(s) Oral two times a day  senna 1 Tablet(s) Oral at bedtime  thiamine IVPB 500 milliGRAM(s) IV Intermittent every 8 hours    MEDICATIONS  (PRN):  HYDROmorphone  Injectable 0.5 milliGRAM(s) IV Push every 4 hours PRN Severe Pain (7 - 10)  HYDROmorphone  Injectable 0.5 milliGRAM(s) IV Push every 6 hours PRN Breakthrough pain  oxyCODONE    IR 5 milliGRAM(s) Oral every 8 hours PRN Moderate Pain (4 - 6)  oxyCODONE    IR 10 milliGRAM(s) Oral every 6 hours PRN Severe Pain (7 - 10)  sodium chloride 0.9% lock flush 10 milliLiter(s) IV Push every 1 hour PRN Pre/post blood products, medications, blood draw, and to maintain line patency
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
## 2022-08-06 NOTE — DIETITIAN INITIAL EVALUATION ADULT - PERTINENT LABORATORY DATA
08-06    135  |  102  |  7.9<L>  ----------------------------<  98  3.6   |  23.0  |  0.60    Ca    7.4<L>      06 Aug 2022 03:25  Phos  1.2     08-06  Mg     2.0     08-06    TPro  6.1<L>  /  Alb  3.4  /  TBili  0.4  /  DBili  0.1  /  AST  97<H>  /  ALT  50<H>  /  AlkPhos  71  08-04
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
## 2022-08-06 NOTE — DIETITIAN INITIAL EVALUATION ADULT - ADD RECOMMEND
SLP swallow re-evaluation as feasible/appropriate. Continue MVI, thiamine, and folic acid supplementation, add vitamin C 500mg daily. Encourage po intake, monitor diet tolerance, and provide assistance at meals as needed. Obtain daily weights to monitor trends.

## 2022-08-07 LAB
ANION GAP SERPL CALC-SCNC: 12 MMOL/L — SIGNIFICANT CHANGE UP (ref 5–17)
APPEARANCE UR: CLEAR — SIGNIFICANT CHANGE UP
BACTERIA # UR AUTO: ABNORMAL
BILIRUB UR-MCNC: NEGATIVE — SIGNIFICANT CHANGE UP
BUN SERPL-MCNC: 9 MG/DL — SIGNIFICANT CHANGE UP (ref 8–20)
CALCIUM SERPL-MCNC: 8.3 MG/DL — LOW (ref 8.4–10.5)
CHLORIDE SERPL-SCNC: 101 MMOL/L — SIGNIFICANT CHANGE UP (ref 98–107)
CO2 SERPL-SCNC: 21 MMOL/L — LOW (ref 22–29)
COLOR SPEC: YELLOW — SIGNIFICANT CHANGE UP
CREAT SERPL-MCNC: 0.55 MG/DL — SIGNIFICANT CHANGE UP (ref 0.5–1.3)
CULTURE RESULTS: SIGNIFICANT CHANGE UP
DIFF PNL FLD: ABNORMAL
EGFR: 115 ML/MIN/1.73M2 — SIGNIFICANT CHANGE UP
EPI CELLS # UR: SIGNIFICANT CHANGE UP
GLUCOSE SERPL-MCNC: 138 MG/DL — HIGH (ref 70–99)
GLUCOSE UR QL: NEGATIVE MG/DL — SIGNIFICANT CHANGE UP
GRAM STN FLD: SIGNIFICANT CHANGE UP
HCT VFR BLD CALC: 33.7 % — LOW (ref 39–50)
HGB BLD-MCNC: 11.9 G/DL — LOW (ref 13–17)
KETONES UR-MCNC: ABNORMAL
LACTATE SERPL-SCNC: 1.1 MMOL/L — SIGNIFICANT CHANGE UP (ref 0.5–2)
LEUKOCYTE ESTERASE UR-ACNC: ABNORMAL
MAGNESIUM SERPL-MCNC: 2.1 MG/DL — SIGNIFICANT CHANGE UP (ref 1.6–2.6)
MCHC RBC-ENTMCNC: 31.9 PG — SIGNIFICANT CHANGE UP (ref 27–34)
MCHC RBC-ENTMCNC: 35.3 GM/DL — SIGNIFICANT CHANGE UP (ref 32–36)
MCV RBC AUTO: 90.3 FL — SIGNIFICANT CHANGE UP (ref 80–100)
NITRITE UR-MCNC: NEGATIVE — SIGNIFICANT CHANGE UP
PH UR: 7 — SIGNIFICANT CHANGE UP (ref 5–8)
PHOSPHATE SERPL-MCNC: 2.7 MG/DL — SIGNIFICANT CHANGE UP (ref 2.4–4.7)
PLATELET # BLD AUTO: 183 K/UL — SIGNIFICANT CHANGE UP (ref 150–400)
POTASSIUM SERPL-MCNC: 4.1 MMOL/L — SIGNIFICANT CHANGE UP (ref 3.5–5.3)
POTASSIUM SERPL-SCNC: 4.1 MMOL/L — SIGNIFICANT CHANGE UP (ref 3.5–5.3)
PROCALCITONIN SERPL-MCNC: 0.06 NG/ML — SIGNIFICANT CHANGE UP (ref 0.02–0.1)
PROT UR-MCNC: 15
RBC # BLD: 3.73 M/UL — LOW (ref 4.2–5.8)
RBC # FLD: 11.4 % — SIGNIFICANT CHANGE UP (ref 10.3–14.5)
RBC CASTS # UR COMP ASSIST: SIGNIFICANT CHANGE UP /HPF (ref 0–4)
SODIUM SERPL-SCNC: 134 MMOL/L — LOW (ref 135–145)
SP GR SPEC: 1 — LOW (ref 1.01–1.02)
SPECIMEN SOURCE: SIGNIFICANT CHANGE UP
SPECIMEN SOURCE: SIGNIFICANT CHANGE UP
UROBILINOGEN FLD QL: 1 MG/DL
WBC # BLD: 10.41 K/UL — SIGNIFICANT CHANGE UP (ref 3.8–10.5)
WBC # FLD AUTO: 10.41 K/UL — SIGNIFICANT CHANGE UP (ref 3.8–10.5)
WBC UR QL: SIGNIFICANT CHANGE UP /HPF (ref 0–5)

## 2022-08-07 PROCEDURE — 93970 EXTREMITY STUDY: CPT | Mod: 26

## 2022-08-07 PROCEDURE — 99233 SBSQ HOSP IP/OBS HIGH 50: CPT

## 2022-08-07 PROCEDURE — 71045 X-RAY EXAM CHEST 1 VIEW: CPT | Mod: 26

## 2022-08-07 RX ORDER — PIPERACILLIN AND TAZOBACTAM 4; .5 G/20ML; G/20ML
3.38 INJECTION, POWDER, LYOPHILIZED, FOR SOLUTION INTRAVENOUS ONCE
Refills: 0 | Status: COMPLETED | OUTPATIENT
Start: 2022-08-07 | End: 2022-08-07

## 2022-08-07 RX ORDER — PIPERACILLIN AND TAZOBACTAM 4; .5 G/20ML; G/20ML
3.38 INJECTION, POWDER, LYOPHILIZED, FOR SOLUTION INTRAVENOUS EVERY 8 HOURS
Refills: 0 | Status: DISCONTINUED | OUTPATIENT
Start: 2022-08-07 | End: 2022-08-09

## 2022-08-07 RX ORDER — MAGNESIUM HYDROXIDE 400 MG/1
30 TABLET, CHEWABLE ORAL DAILY
Refills: 0 | Status: DISCONTINUED | OUTPATIENT
Start: 2022-08-07 | End: 2022-08-08

## 2022-08-07 RX ORDER — ROBINUL 0.2 MG/ML
0.1 INJECTION INTRAMUSCULAR; INTRAVENOUS EVERY 8 HOURS
Refills: 0 | Status: DISCONTINUED | OUTPATIENT
Start: 2022-08-07 | End: 2022-08-19

## 2022-08-07 RX ORDER — IPRATROPIUM/ALBUTEROL SULFATE 18-103MCG
3 AEROSOL WITH ADAPTER (GRAM) INHALATION EVERY 6 HOURS
Refills: 0 | Status: DISCONTINUED | OUTPATIENT
Start: 2022-08-07 | End: 2022-08-19

## 2022-08-07 RX ADMIN — Medication 600 MILLIGRAM(S): at 10:19

## 2022-08-07 RX ADMIN — OXYCODONE HYDROCHLORIDE 5 MILLIGRAM(S): 5 TABLET ORAL at 09:28

## 2022-08-07 RX ADMIN — CHLORHEXIDINE GLUCONATE 1 APPLICATION(S): 213 SOLUTION TOPICAL at 12:10

## 2022-08-07 RX ADMIN — OXYCODONE HYDROCHLORIDE 10 MILLIGRAM(S): 5 TABLET ORAL at 21:14

## 2022-08-07 RX ADMIN — Medication 0.5 MILLIGRAM(S): at 21:14

## 2022-08-07 RX ADMIN — Medication 5 MILLIGRAM(S): at 21:14

## 2022-08-07 RX ADMIN — METHOCARBAMOL 500 MILLIGRAM(S): 500 TABLET, FILM COATED ORAL at 13:32

## 2022-08-07 RX ADMIN — METHOCARBAMOL 500 MILLIGRAM(S): 500 TABLET, FILM COATED ORAL at 05:38

## 2022-08-07 RX ADMIN — Medication 600 MILLIGRAM(S): at 17:55

## 2022-08-07 RX ADMIN — ENOXAPARIN SODIUM 40 MILLIGRAM(S): 100 INJECTION SUBCUTANEOUS at 21:13

## 2022-08-07 RX ADMIN — Medication 1 TABLET(S): at 12:10

## 2022-08-07 RX ADMIN — Medication 650 MILLIGRAM(S): at 21:44

## 2022-08-07 RX ADMIN — Medication 105 MILLIGRAM(S): at 13:35

## 2022-08-07 RX ADMIN — Medication 650 MILLIGRAM(S): at 12:52

## 2022-08-07 RX ADMIN — CHLORHEXIDINE GLUCONATE 1 APPLICATION(S): 213 SOLUTION TOPICAL at 05:38

## 2022-08-07 RX ADMIN — Medication 4 MILLIGRAM(S): at 05:38

## 2022-08-07 RX ADMIN — Medication 4 MILLIGRAM(S): at 12:10

## 2022-08-07 RX ADMIN — POLYETHYLENE GLYCOL 3350 17 GRAM(S): 17 POWDER, FOR SOLUTION ORAL at 05:38

## 2022-08-07 RX ADMIN — Medication 1 MILLIGRAM(S): at 12:09

## 2022-08-07 RX ADMIN — PIPERACILLIN AND TAZOBACTAM 200 GRAM(S): 4; .5 INJECTION, POWDER, LYOPHILIZED, FOR SOLUTION INTRAVENOUS at 17:55

## 2022-08-07 RX ADMIN — Medication 105 MILLIGRAM(S): at 06:23

## 2022-08-07 RX ADMIN — POLYETHYLENE GLYCOL 3350 17 GRAM(S): 17 POWDER, FOR SOLUTION ORAL at 17:55

## 2022-08-07 RX ADMIN — METHOCARBAMOL 500 MILLIGRAM(S): 500 TABLET, FILM COATED ORAL at 21:14

## 2022-08-07 RX ADMIN — OXYCODONE HYDROCHLORIDE 5 MILLIGRAM(S): 5 TABLET ORAL at 10:28

## 2022-08-07 RX ADMIN — PIPERACILLIN AND TAZOBACTAM 25 GRAM(S): 4; .5 INJECTION, POWDER, LYOPHILIZED, FOR SOLUTION INTRAVENOUS at 21:13

## 2022-08-07 RX ADMIN — ROBINUL 0.1 MILLIGRAM(S): 0.2 INJECTION INTRAMUSCULAR; INTRAVENOUS at 17:56

## 2022-08-07 RX ADMIN — Medication 105 MILLIGRAM(S): at 21:35

## 2022-08-07 RX ADMIN — Medication 4 MILLIGRAM(S): at 17:56

## 2022-08-07 RX ADMIN — Medication 650 MILLIGRAM(S): at 11:52

## 2022-08-07 RX ADMIN — Medication 650 MILLIGRAM(S): at 21:14

## 2022-08-07 RX ADMIN — SENNA PLUS 1 TABLET(S): 8.6 TABLET ORAL at 21:14

## 2022-08-07 RX ADMIN — OXYCODONE HYDROCHLORIDE 10 MILLIGRAM(S): 5 TABLET ORAL at 21:44
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
## 2022-08-08 DIAGNOSIS — J38.00 PARALYSIS OF VOCAL CORDS AND LARYNX, UNSPECIFIED: ICD-10-CM

## 2022-08-08 LAB
-  BLOOD PCR PANEL: SIGNIFICANT CHANGE UP
ANION GAP SERPL CALC-SCNC: 12 MMOL/L — SIGNIFICANT CHANGE UP (ref 5–17)
BUN SERPL-MCNC: 20.8 MG/DL — HIGH (ref 8–20)
CALCIUM SERPL-MCNC: 8.3 MG/DL — LOW (ref 8.4–10.5)
CHLORIDE SERPL-SCNC: 101 MMOL/L — SIGNIFICANT CHANGE UP (ref 98–107)
CO2 SERPL-SCNC: 22 MMOL/L — SIGNIFICANT CHANGE UP (ref 22–29)
CREAT SERPL-MCNC: 0.84 MG/DL — SIGNIFICANT CHANGE UP (ref 0.5–1.3)
EGFR: 101 ML/MIN/1.73M2 — SIGNIFICANT CHANGE UP
GLUCOSE SERPL-MCNC: 142 MG/DL — HIGH (ref 70–99)
GRAM STN FLD: SIGNIFICANT CHANGE UP
HCT VFR BLD CALC: 32.3 % — LOW (ref 39–50)
HGB BLD-MCNC: 11.7 G/DL — LOW (ref 13–17)
MAGNESIUM SERPL-MCNC: 2.1 MG/DL — SIGNIFICANT CHANGE UP (ref 1.6–2.6)
MCHC RBC-ENTMCNC: 32.9 PG — SIGNIFICANT CHANGE UP (ref 27–34)
MCHC RBC-ENTMCNC: 36.2 GM/DL — HIGH (ref 32–36)
MCV RBC AUTO: 90.7 FL — SIGNIFICANT CHANGE UP (ref 80–100)
METHOD TYPE: SIGNIFICANT CHANGE UP
PHOSPHATE SERPL-MCNC: 4.2 MG/DL — SIGNIFICANT CHANGE UP (ref 2.4–4.7)
PLATELET # BLD AUTO: 197 K/UL — SIGNIFICANT CHANGE UP (ref 150–400)
POTASSIUM SERPL-MCNC: 4.1 MMOL/L — SIGNIFICANT CHANGE UP (ref 3.5–5.3)
POTASSIUM SERPL-SCNC: 4.1 MMOL/L — SIGNIFICANT CHANGE UP (ref 3.5–5.3)
RBC # BLD: 3.56 M/UL — LOW (ref 4.2–5.8)
RBC # FLD: 11.4 % — SIGNIFICANT CHANGE UP (ref 10.3–14.5)
SODIUM SERPL-SCNC: 135 MMOL/L — SIGNIFICANT CHANGE UP (ref 135–145)
SPECIMEN SOURCE: SIGNIFICANT CHANGE UP
WBC # BLD: 12.59 K/UL — HIGH (ref 3.8–10.5)
WBC # FLD AUTO: 12.59 K/UL — HIGH (ref 3.8–10.5)

## 2022-08-08 PROCEDURE — 31505 DIAGNOSTIC LARYNGOSCOPY: CPT

## 2022-08-08 PROCEDURE — 99291 CRITICAL CARE FIRST HOUR: CPT

## 2022-08-08 PROCEDURE — 99024 POSTOP FOLLOW-UP VISIT: CPT

## 2022-08-08 RX ORDER — THIAMINE MONONITRATE (VIT B1) 100 MG
100 TABLET ORAL DAILY
Refills: 0 | Status: DISCONTINUED | OUTPATIENT
Start: 2022-08-09 | End: 2022-08-19

## 2022-08-08 RX ORDER — DEXAMETHASONE 0.5 MG/5ML
3 ELIXIR ORAL EVERY 8 HOURS
Refills: 0 | Status: DISCONTINUED | OUTPATIENT
Start: 2022-08-08 | End: 2022-08-09

## 2022-08-08 RX ORDER — TAMSULOSIN HYDROCHLORIDE 0.4 MG/1
0.4 CAPSULE ORAL AT BEDTIME
Refills: 0 | Status: DISCONTINUED | OUTPATIENT
Start: 2022-08-08 | End: 2022-08-19

## 2022-08-08 RX ORDER — SODIUM CHLORIDE 9 MG/ML
1000 INJECTION INTRAMUSCULAR; INTRAVENOUS; SUBCUTANEOUS
Refills: 0 | Status: DISCONTINUED | OUTPATIENT
Start: 2022-08-08 | End: 2022-08-10

## 2022-08-08 RX ADMIN — Medication 650 MILLIGRAM(S): at 10:01

## 2022-08-08 RX ADMIN — PIPERACILLIN AND TAZOBACTAM 25 GRAM(S): 4; .5 INJECTION, POWDER, LYOPHILIZED, FOR SOLUTION INTRAVENOUS at 05:54

## 2022-08-08 RX ADMIN — Medication 3 MILLIGRAM(S): at 21:42

## 2022-08-08 RX ADMIN — POLYETHYLENE GLYCOL 3350 17 GRAM(S): 17 POWDER, FOR SOLUTION ORAL at 05:54

## 2022-08-08 RX ADMIN — Medication 1 TABLET(S): at 09:31

## 2022-08-08 RX ADMIN — ENOXAPARIN SODIUM 40 MILLIGRAM(S): 100 INJECTION SUBCUTANEOUS at 21:43

## 2022-08-08 RX ADMIN — Medication 10 MILLIGRAM(S): at 17:10

## 2022-08-08 RX ADMIN — METHOCARBAMOL 500 MILLIGRAM(S): 500 TABLET, FILM COATED ORAL at 05:53

## 2022-08-08 RX ADMIN — Medication 105 MILLIGRAM(S): at 07:18

## 2022-08-08 RX ADMIN — Medication 1 MILLIGRAM(S): at 09:31

## 2022-08-08 RX ADMIN — Medication 4 MILLIGRAM(S): at 00:28

## 2022-08-08 RX ADMIN — Medication 600 MILLIGRAM(S): at 05:52

## 2022-08-08 RX ADMIN — Medication 5 MILLIGRAM(S): at 17:39

## 2022-08-08 RX ADMIN — Medication 4 MILLIGRAM(S): at 11:19

## 2022-08-08 RX ADMIN — PIPERACILLIN AND TAZOBACTAM 25 GRAM(S): 4; .5 INJECTION, POWDER, LYOPHILIZED, FOR SOLUTION INTRAVENOUS at 14:02

## 2022-08-08 RX ADMIN — PIPERACILLIN AND TAZOBACTAM 25 GRAM(S): 4; .5 INJECTION, POWDER, LYOPHILIZED, FOR SOLUTION INTRAVENOUS at 21:43

## 2022-08-08 RX ADMIN — Medication 10 MILLIGRAM(S): at 09:30

## 2022-08-08 RX ADMIN — Medication 4 MILLIGRAM(S): at 05:53

## 2022-08-08 RX ADMIN — Medication 650 MILLIGRAM(S): at 09:31

## 2022-08-08 RX ADMIN — Medication 600 MILLIGRAM(S): at 17:11

## 2022-08-08 RX ADMIN — CHLORHEXIDINE GLUCONATE 1 APPLICATION(S): 213 SOLUTION TOPICAL at 05:52

## 2022-08-08 RX ADMIN — SODIUM CHLORIDE 50 MILLILITER(S): 9 INJECTION INTRAMUSCULAR; INTRAVENOUS; SUBCUTANEOUS at 15:38

## 2022-08-08 RX ADMIN — POLYETHYLENE GLYCOL 3350 17 GRAM(S): 17 POWDER, FOR SOLUTION ORAL at 17:10

## 2022-08-08 RX ADMIN — CHLORHEXIDINE GLUCONATE 1 APPLICATION(S): 213 SOLUTION TOPICAL at 11:19

## 2022-08-08 RX ADMIN — METHOCARBAMOL 500 MILLIGRAM(S): 500 TABLET, FILM COATED ORAL at 14:02
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
## 2022-08-08 NOTE — PROGRESS NOTE ADULT - ASSESSMENT
Assessment: 56M w/ C6-7 fracture/dislocation s/p C6-7 ACDF and C5-T2 posterior instrumented fusion, POD 4. Post operative course uneventful.   - q2h neuro checks   - maintain ASPEN collar   - Monitor drainage output quality and quantity   - Maintain MAPs >65  - Lovenox on board for dvt ppx   - tolerating diet  - saturating well on RA   - PT/OT  - further plan pending AM rounds with neurosurgical attending

## 2022-08-08 NOTE — PHARMACOTHERAPY INTERVENTION NOTE - COMMENTS
Spoke with patient at bedside regarding home medication list. Patient reports occasionally taking aspirin and a sleeping aid at home, no chronic medications

## 2022-08-08 NOTE — CONSULT NOTE ADULT - PROBLEM SELECTOR RECOMMENDATION 9
-Recommend Objective swallow study with speech and swallow  -Further plans contingent upon diet status -Recommend objective swallow study with speech and swallow  -Recommend work with SLP to strengthen voice and improve phonation  -Further plans pending swallow function. Can consider injection thyroplasty as an inpatient if aspiration risk warrants inpatient intervention. Otherwise will plan to have him follow up for voice therapy and outpatient laryngology follow up.
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
## 2022-08-09 LAB
ANION GAP SERPL CALC-SCNC: 12 MMOL/L — SIGNIFICANT CHANGE UP (ref 5–17)
BUN SERPL-MCNC: 14.9 MG/DL — SIGNIFICANT CHANGE UP (ref 8–20)
CALCIUM SERPL-MCNC: 8.1 MG/DL — LOW (ref 8.4–10.5)
CHLORIDE SERPL-SCNC: 104 MMOL/L — SIGNIFICANT CHANGE UP (ref 98–107)
CO2 SERPL-SCNC: 21 MMOL/L — LOW (ref 22–29)
CREAT SERPL-MCNC: 0.63 MG/DL — SIGNIFICANT CHANGE UP (ref 0.5–1.3)
CULTURE RESULTS: SIGNIFICANT CHANGE UP
CULTURE RESULTS: SIGNIFICANT CHANGE UP
EGFR: 110 ML/MIN/1.73M2 — SIGNIFICANT CHANGE UP
GLUCOSE SERPL-MCNC: 116 MG/DL — HIGH (ref 70–99)
HCT VFR BLD CALC: 36 % — LOW (ref 39–50)
HGB BLD-MCNC: 12.8 G/DL — LOW (ref 13–17)
MAGNESIUM SERPL-MCNC: 2.5 MG/DL — SIGNIFICANT CHANGE UP (ref 1.6–2.6)
MCHC RBC-ENTMCNC: 32.6 PG — SIGNIFICANT CHANGE UP (ref 27–34)
MCHC RBC-ENTMCNC: 35.6 GM/DL — SIGNIFICANT CHANGE UP (ref 32–36)
MCV RBC AUTO: 91.6 FL — SIGNIFICANT CHANGE UP (ref 80–100)
ORGANISM # SPEC MICROSCOPIC CNT: SIGNIFICANT CHANGE UP
ORGANISM # SPEC MICROSCOPIC CNT: SIGNIFICANT CHANGE UP
PHOSPHATE SERPL-MCNC: 3.2 MG/DL — SIGNIFICANT CHANGE UP (ref 2.4–4.7)
PLATELET # BLD AUTO: 236 K/UL — SIGNIFICANT CHANGE UP (ref 150–400)
POTASSIUM SERPL-MCNC: 4.6 MMOL/L — SIGNIFICANT CHANGE UP (ref 3.5–5.3)
POTASSIUM SERPL-SCNC: 4.6 MMOL/L — SIGNIFICANT CHANGE UP (ref 3.5–5.3)
RBC # BLD: 3.93 M/UL — LOW (ref 4.2–5.8)
RBC # FLD: 11.6 % — SIGNIFICANT CHANGE UP (ref 10.3–14.5)
SODIUM SERPL-SCNC: 137 MMOL/L — SIGNIFICANT CHANGE UP (ref 135–145)
SPECIMEN SOURCE: SIGNIFICANT CHANGE UP
SPECIMEN SOURCE: SIGNIFICANT CHANGE UP
WBC # BLD: 13.2 K/UL — HIGH (ref 3.8–10.5)
WBC # FLD AUTO: 13.2 K/UL — HIGH (ref 3.8–10.5)

## 2022-08-09 PROCEDURE — 74230 X-RAY XM SWLNG FUNCJ C+: CPT | Mod: 26

## 2022-08-09 PROCEDURE — 99291 CRITICAL CARE FIRST HOUR: CPT

## 2022-08-09 RX ORDER — DEXAMETHASONE 0.5 MG/5ML
3 ELIXIR ORAL DAILY
Refills: 0 | Status: COMPLETED | OUTPATIENT
Start: 2022-08-11 | End: 2022-08-12

## 2022-08-09 RX ORDER — PIPERACILLIN AND TAZOBACTAM 4; .5 G/20ML; G/20ML
3.38 INJECTION, POWDER, LYOPHILIZED, FOR SOLUTION INTRAVENOUS EVERY 8 HOURS
Refills: 0 | Status: COMPLETED | OUTPATIENT
Start: 2022-08-09 | End: 2022-08-12

## 2022-08-09 RX ORDER — DEXAMETHASONE 0.5 MG/5ML
ELIXIR ORAL
Refills: 0 | Status: COMPLETED | OUTPATIENT
Start: 2022-08-09 | End: 2022-08-12

## 2022-08-09 RX ORDER — DEXAMETHASONE 0.5 MG/5ML
3 ELIXIR ORAL EVERY 12 HOURS
Refills: 0 | Status: COMPLETED | OUTPATIENT
Start: 2022-08-10 | End: 2022-08-11

## 2022-08-09 RX ORDER — DEXAMETHASONE 0.5 MG/5ML
3 ELIXIR ORAL EVERY 8 HOURS
Refills: 0 | Status: COMPLETED | OUTPATIENT
Start: 2022-08-09 | End: 2022-08-10

## 2022-08-09 RX ADMIN — Medication 600 MILLIGRAM(S): at 08:49

## 2022-08-09 RX ADMIN — METHOCARBAMOL 500 MILLIGRAM(S): 500 TABLET, FILM COATED ORAL at 21:30

## 2022-08-09 RX ADMIN — PIPERACILLIN AND TAZOBACTAM 25 GRAM(S): 4; .5 INJECTION, POWDER, LYOPHILIZED, FOR SOLUTION INTRAVENOUS at 06:00

## 2022-08-09 RX ADMIN — CHLORHEXIDINE GLUCONATE 1 APPLICATION(S): 213 SOLUTION TOPICAL at 10:39

## 2022-08-09 RX ADMIN — METHOCARBAMOL 500 MILLIGRAM(S): 500 TABLET, FILM COATED ORAL at 14:18

## 2022-08-09 RX ADMIN — PIPERACILLIN AND TAZOBACTAM 25 GRAM(S): 4; .5 INJECTION, POWDER, LYOPHILIZED, FOR SOLUTION INTRAVENOUS at 21:31

## 2022-08-09 RX ADMIN — PIPERACILLIN AND TAZOBACTAM 25 GRAM(S): 4; .5 INJECTION, POWDER, LYOPHILIZED, FOR SOLUTION INTRAVENOUS at 14:17

## 2022-08-09 RX ADMIN — SODIUM CHLORIDE 50 MILLILITER(S): 9 INJECTION INTRAMUSCULAR; INTRAVENOUS; SUBCUTANEOUS at 21:27

## 2022-08-09 RX ADMIN — Medication 100 MILLIGRAM(S): at 11:10

## 2022-08-09 RX ADMIN — SODIUM CHLORIDE 50 MILLILITER(S): 9 INJECTION INTRAMUSCULAR; INTRAVENOUS; SUBCUTANEOUS at 11:09

## 2022-08-09 RX ADMIN — Medication 1 TABLET(S): at 11:10

## 2022-08-09 RX ADMIN — Medication 3 MILLIGRAM(S): at 14:17

## 2022-08-09 RX ADMIN — Medication 600 MILLIGRAM(S): at 22:19

## 2022-08-09 RX ADMIN — Medication 3 MILLIGRAM(S): at 21:30

## 2022-08-09 RX ADMIN — Medication 3 MILLIGRAM(S): at 06:00

## 2022-08-09 RX ADMIN — Medication 1 MILLIGRAM(S): at 11:10

## 2022-08-09 RX ADMIN — Medication 5 MILLIGRAM(S): at 21:30

## 2022-08-09 RX ADMIN — ENOXAPARIN SODIUM 40 MILLIGRAM(S): 100 INJECTION SUBCUTANEOUS at 21:30

## 2022-08-09 RX ADMIN — TAMSULOSIN HYDROCHLORIDE 0.4 MILLIGRAM(S): 0.4 CAPSULE ORAL at 21:30
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
## 2022-08-09 NOTE — SWALLOW VFSS/MBS ASSESSMENT ADULT - ORAL PHASE
within functional limits/Uncontrolled bolus / spillover in hypopharynx within functional limits intermittent piecemeal/within functional limits Within functional limits
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
## 2022-08-09 NOTE — PROGRESS NOTE ADULT - NS ATTEST RISK PROBLEM GEN_ALL_CORE FT
Patient is not critically ill yet need monitoring due to high risk aspiration secondary to possible vocal cord dysfunction and monitor airway

## 2022-08-09 NOTE — SWALLOW VFSS/MBS ASSESSMENT ADULT - RECOMMENDED FEEDING/EATING TECHNIQUES
allow for swallow between intakes/alternate food with liquid/maintain upright posture during/after eating for 30 mins/oral hygiene/position upright (90 degrees)/provide rest periods between swallows/small sips/bites
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
## 2022-08-09 NOTE — SWALLOW VFSS/MBS ASSESSMENT ADULT - DIAGNOSTIC IMPRESSIONS
Justin-pharyngeal swallow objectively assessed to be WFL. Oral stage characterized by adequate acceptance and mastication of all trials w/ no anterior loss or residue observed. One instance of premature spillage into the hypopharynx w/ soft/bite sized trial noted. Pharyngeal phase characterized by adequate hyo-laryngeal elevation/excursion and upper/lower airway protection. Trace posterior pharyngeal wall residue observed w/ soft/bite sized trials which cleared w/ subsequent swallow. No penetration/aspiration visualized.
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
## 2022-08-10 LAB
ANION GAP SERPL CALC-SCNC: 12 MMOL/L — SIGNIFICANT CHANGE UP (ref 5–17)
BUN SERPL-MCNC: 15.3 MG/DL — SIGNIFICANT CHANGE UP (ref 8–20)
CALCIUM SERPL-MCNC: 8.1 MG/DL — LOW (ref 8.4–10.5)
CHLORIDE SERPL-SCNC: 102 MMOL/L — SIGNIFICANT CHANGE UP (ref 98–107)
CO2 SERPL-SCNC: 19 MMOL/L — LOW (ref 22–29)
CREAT SERPL-MCNC: 0.58 MG/DL — SIGNIFICANT CHANGE UP (ref 0.5–1.3)
EGFR: 113 ML/MIN/1.73M2 — SIGNIFICANT CHANGE UP
GLUCOSE SERPL-MCNC: 119 MG/DL — HIGH (ref 70–99)
HCT VFR BLD CALC: 36.9 % — LOW (ref 39–50)
HGB BLD-MCNC: 13 G/DL — SIGNIFICANT CHANGE UP (ref 13–17)
MCHC RBC-ENTMCNC: 32.7 PG — SIGNIFICANT CHANGE UP (ref 27–34)
MCHC RBC-ENTMCNC: 35.2 GM/DL — SIGNIFICANT CHANGE UP (ref 32–36)
MCV RBC AUTO: 92.7 FL — SIGNIFICANT CHANGE UP (ref 80–100)
PLATELET # BLD AUTO: 257 K/UL — SIGNIFICANT CHANGE UP (ref 150–400)
POTASSIUM SERPL-MCNC: 4.4 MMOL/L — SIGNIFICANT CHANGE UP (ref 3.5–5.3)
POTASSIUM SERPL-SCNC: 4.4 MMOL/L — SIGNIFICANT CHANGE UP (ref 3.5–5.3)
RBC # BLD: 3.98 M/UL — LOW (ref 4.2–5.8)
RBC # FLD: 11.9 % — SIGNIFICANT CHANGE UP (ref 10.3–14.5)
SODIUM SERPL-SCNC: 133 MMOL/L — LOW (ref 135–145)
WBC # BLD: 13.37 K/UL — HIGH (ref 3.8–10.5)
WBC # FLD AUTO: 13.37 K/UL — HIGH (ref 3.8–10.5)

## 2022-08-10 PROCEDURE — 99223 1ST HOSP IP/OBS HIGH 75: CPT

## 2022-08-10 PROCEDURE — 99232 SBSQ HOSP IP/OBS MODERATE 35: CPT

## 2022-08-10 RX ADMIN — Medication 5 MILLIGRAM(S): at 21:39

## 2022-08-10 RX ADMIN — METHOCARBAMOL 500 MILLIGRAM(S): 500 TABLET, FILM COATED ORAL at 06:09

## 2022-08-10 RX ADMIN — METHOCARBAMOL 500 MILLIGRAM(S): 500 TABLET, FILM COATED ORAL at 21:39

## 2022-08-10 RX ADMIN — CHLORHEXIDINE GLUCONATE 1 APPLICATION(S): 213 SOLUTION TOPICAL at 11:31

## 2022-08-10 RX ADMIN — METHOCARBAMOL 500 MILLIGRAM(S): 500 TABLET, FILM COATED ORAL at 13:16

## 2022-08-10 RX ADMIN — Medication 3 MILLIGRAM(S): at 18:06

## 2022-08-10 RX ADMIN — PIPERACILLIN AND TAZOBACTAM 25 GRAM(S): 4; .5 INJECTION, POWDER, LYOPHILIZED, FOR SOLUTION INTRAVENOUS at 21:39

## 2022-08-10 RX ADMIN — PIPERACILLIN AND TAZOBACTAM 25 GRAM(S): 4; .5 INJECTION, POWDER, LYOPHILIZED, FOR SOLUTION INTRAVENOUS at 06:08

## 2022-08-10 RX ADMIN — Medication 1 TABLET(S): at 11:11

## 2022-08-10 RX ADMIN — Medication 600 MILLIGRAM(S): at 21:39

## 2022-08-10 RX ADMIN — Medication 3 MILLIGRAM(S): at 06:09

## 2022-08-10 RX ADMIN — PIPERACILLIN AND TAZOBACTAM 25 GRAM(S): 4; .5 INJECTION, POWDER, LYOPHILIZED, FOR SOLUTION INTRAVENOUS at 13:16

## 2022-08-10 RX ADMIN — Medication 600 MILLIGRAM(S): at 09:24

## 2022-08-10 RX ADMIN — Medication 100 MILLIGRAM(S): at 11:11

## 2022-08-10 RX ADMIN — TAMSULOSIN HYDROCHLORIDE 0.4 MILLIGRAM(S): 0.4 CAPSULE ORAL at 21:39

## 2022-08-10 RX ADMIN — SODIUM CHLORIDE 50 MILLILITER(S): 9 INJECTION INTRAMUSCULAR; INTRAVENOUS; SUBCUTANEOUS at 06:08

## 2022-08-10 RX ADMIN — Medication 1 MILLIGRAM(S): at 11:11

## 2022-08-10 RX ADMIN — ENOXAPARIN SODIUM 40 MILLIGRAM(S): 100 INJECTION SUBCUTANEOUS at 21:39
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
## 2022-08-10 NOTE — PROGRESS NOTE ADULT - PROBLEM SELECTOR PLAN 1
- Passed MBS for all consistencies  - Pt with weak cough which is bothersome to him, could consider R TVF injection as inpt or outpt to help improve cough and voice  - Recommend continue work with SLP for voice exercises   - Will discuss further with pt and primary team regarding further intervention

## 2022-08-10 NOTE — PROGRESS NOTE ADULT - SUBJECTIVE AND OBJECTIVE BOX
ENT ISSUE: R TVF paralysis/paresis    HPI: Had MBS yesterday. Shows adequate swallow on all consistencies including thins. Continues to report very weak cough, which is bothersome because he is having trouble clearing secretions. Voice is weak, but no problems with communication.        PAST MEDICAL & SURGICAL HISTORY:  No pertinent past medical history      No significant past surgical history        Allergies    No Known Allergies    Intolerances      MEDICATIONS  (STANDING):  chlorhexidine 2% Cloths 1 Application(s) Topical daily  dexAMETHasone  Injectable   IV Push   dexAMETHasone  Injectable 3 milliGRAM(s) IV Push every 12 hours  enoxaparin Injectable 40 milliGRAM(s) SubCutaneous every 24 hours  folic acid 1 milliGRAM(s) Oral daily  guaiFENesin  milliGRAM(s) Oral every 12 hours  melatonin 5 milliGRAM(s) Oral at bedtime  methocarbamol 500 milliGRAM(s) Oral every 8 hours  multivitamin 1 Tablet(s) Oral daily  piperacillin/tazobactam IVPB.. 3.375 Gram(s) IV Intermittent every 8 hours  sodium chloride 0.9%. 1000 milliLiter(s) (50 mL/Hr) IV Continuous <Continuous>  tamsulosin 0.4 milliGRAM(s) Oral at bedtime  thiamine 100 milliGRAM(s) Oral daily    MEDICATIONS  (PRN):  acetaminophen     Tablet .. 650 milliGRAM(s) Oral every 6 hours PRN Temp greater or equal to 38C (100.4F), Mild Pain (1 - 3)  albuterol/ipratropium for Nebulization 3 milliLiter(s) Nebulizer every 6 hours PRN Shortness of Breath and/or Wheezing  ALPRAZolam 0.5 milliGRAM(s) Oral every 12 hours PRN anxiety  bisacodyl Suppository 10 milliGRAM(s) Rectal daily PRN for no BM x 3 days  glycopyrrolate Injectable 0.1 milliGRAM(s) IV Push every 8 hours PRN secretions  HYDROmorphone  Injectable 0.5 milliGRAM(s) IV Push every 4 hours PRN Severe Pain (7 - 10)  oxyCODONE    IR 5 milliGRAM(s) Oral every 8 hours PRN Moderate Pain (4 - 6)  oxyCODONE    IR 10 milliGRAM(s) Oral every 6 hours PRN Severe Pain (7 - 10)      Social History: see consult note    Family history: see consult note    ROS:   ENT: all negative except as noted in HPI   Pulm: denies SOB, cough, hemoptysis  Neuro: denies numbness/tingling, loss of sensation  Endo: denies heat/cold intolerance, excessive sweating      Vital Signs Last 24 Hrs  T(C): 36.9 (10 Aug 2022 07:35), Max: 37.1 (09 Aug 2022 19:59)  T(F): 98.4 (10 Aug 2022 07:35), Max: 98.8 (10 Aug 2022 04:00)  HR: 70 (10 Aug 2022 07:00) (58 - 76)  BP: 126/80 (10 Aug 2022 07:00) (116/76 - 151/90)  BP(mean): 92 (10 Aug 2022 07:00) (82 - 109)  RR: 17 (10 Aug 2022 07:00) (17 - 25)  SpO2: 93% (10 Aug 2022 07:00) (93% - 100%)    Parameters below as of 10 Aug 2022 07:00  Patient On (Oxygen Delivery Method): room air                              13.0   13.37 )-----------( 257      ( 10 Aug 2022 07:50 )             36.9    08-10    133<L>  |  102  |  15.3  ----------------------------<  119<H>  4.4   |  19.0<L>  |  0.58    Ca    8.1<L>      10 Aug 2022 07:50  Phos  3.2     08-09  Mg     2.5     08-09         PHYSICAL EXAM:  Gen: NAD  Skin: No rashes, bruises, or lesions  Head: Normocephalic, Atraumatic  Face: no edema, erythema, or fluctuance. Parotid glands soft without mass  Eyes: no scleral injection  Nose: Nares bilaterally patent  Mouth: Suctioning secretions, tolerating his diet without coughing  Neck: in c collar, ACDF incision on R c/d/i  Lymphatic: No lymphadenopathy  Resp: breathing easily, no stridor  Neuro: facial nerve intact, no facial droop

## 2022-08-10 NOTE — PROGRESS NOTE ADULT - ASSESSMENT
56M with R TVF paralysis/severe paresis following ACDF on 8/4, cleared diet for all consistencies yesterday, continues with very weak cough which is bothersome.
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
## 2022-08-10 NOTE — PROGRESS NOTE ADULT - ASSESSMENT
56y Male presented with C6-7 B/L jumped facets s/p C6-7 ACDF & posterior cervical & thoracic decompression & fusion of C5-T2. POD#6.    Plan:  -D/w Dr. Gonzalez  -Q4h neuro checks  -Decadron taper initiated  -Remain in c-collar at all times  -Robaxin 500 TID  -Pain control PRN  -Diet; regular with thin liquids per speech/swallow  -Lovenox & SCDs for DVT PPX  -Zosyn (8/7-8/11) for PNA, blood cultures pending   -ENT consulted for vocal cord paralysis; recommend continued therapy with SLP, could consider R TVF injection inpt/outpt to improve cough  -Rectal tube in place 56y Male presented with C6-7 B/L jumped facets s/p C6-7 ACDF & posterior cervical & thoracic decompression & fusion of C5-T2. POD#6.    Plan:  -D/w Dr. Gonzalez  -Q4h neuro checks  -Decadron taper initiated  -Remain in c-collar at all times  -Robaxin 500 TID  -Pain control PRN  -Diet; regular with thin liquids per speech/swallow  -Lovenox & SCDs for DVT PPX  -ID consulted for +blood cultures; continue Zosyn (8/7-8/11) for PNA, repeat blood cultures pending, original + culture likely contaminant   -ENT consulted for vocal cord paralysis; recommend continued therapy with SLP, could consider R TVF injection inpt/outpt to improve cough  -Rectal tube in place
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
## 2022-08-11 LAB
ANION GAP SERPL CALC-SCNC: 13 MMOL/L — SIGNIFICANT CHANGE UP (ref 5–17)
BUN SERPL-MCNC: 19.1 MG/DL — SIGNIFICANT CHANGE UP (ref 8–20)
CALCIUM SERPL-MCNC: 7.9 MG/DL — LOW (ref 8.4–10.5)
CHLORIDE SERPL-SCNC: 104 MMOL/L — SIGNIFICANT CHANGE UP (ref 98–107)
CO2 SERPL-SCNC: 20 MMOL/L — LOW (ref 22–29)
CREAT SERPL-MCNC: 0.59 MG/DL — SIGNIFICANT CHANGE UP (ref 0.5–1.3)
EGFR: 112 ML/MIN/1.73M2 — SIGNIFICANT CHANGE UP
GLUCOSE SERPL-MCNC: 107 MG/DL — HIGH (ref 70–99)
HCT VFR BLD CALC: 35.9 % — LOW (ref 39–50)
HGB BLD-MCNC: 12.6 G/DL — LOW (ref 13–17)
MAGNESIUM SERPL-MCNC: 2.3 MG/DL — SIGNIFICANT CHANGE UP (ref 1.6–2.6)
MCHC RBC-ENTMCNC: 32.6 PG — SIGNIFICANT CHANGE UP (ref 27–34)
MCHC RBC-ENTMCNC: 35.1 GM/DL — SIGNIFICANT CHANGE UP (ref 32–36)
MCV RBC AUTO: 93 FL — SIGNIFICANT CHANGE UP (ref 80–100)
PHOSPHATE SERPL-MCNC: 2.9 MG/DL — SIGNIFICANT CHANGE UP (ref 2.4–4.7)
PLATELET # BLD AUTO: 330 K/UL — SIGNIFICANT CHANGE UP (ref 150–400)
POTASSIUM SERPL-MCNC: 3.8 MMOL/L — SIGNIFICANT CHANGE UP (ref 3.5–5.3)
POTASSIUM SERPL-SCNC: 3.8 MMOL/L — SIGNIFICANT CHANGE UP (ref 3.5–5.3)
RBC # BLD: 3.86 M/UL — LOW (ref 4.2–5.8)
RBC # FLD: 12.1 % — SIGNIFICANT CHANGE UP (ref 10.3–14.5)
SODIUM SERPL-SCNC: 137 MMOL/L — SIGNIFICANT CHANGE UP (ref 135–145)
WBC # BLD: 13.82 K/UL — HIGH (ref 3.8–10.5)
WBC # FLD AUTO: 13.82 K/UL — HIGH (ref 3.8–10.5)

## 2022-08-11 PROCEDURE — 99233 SBSQ HOSP IP/OBS HIGH 50: CPT

## 2022-08-11 RX ORDER — POLYETHYLENE GLYCOL 3350 17 G/17G
17 POWDER, FOR SOLUTION ORAL AT BEDTIME
Refills: 0 | Status: DISCONTINUED | OUTPATIENT
Start: 2022-08-11 | End: 2022-08-19

## 2022-08-11 RX ORDER — BACLOFEN 100 %
10 POWDER (GRAM) MISCELLANEOUS AT BEDTIME
Refills: 0 | Status: DISCONTINUED | OUTPATIENT
Start: 2022-08-11 | End: 2022-08-19

## 2022-08-11 RX ORDER — SENNA PLUS 8.6 MG/1
2 TABLET ORAL AT BEDTIME
Refills: 0 | Status: DISCONTINUED | OUTPATIENT
Start: 2022-08-11 | End: 2022-08-19

## 2022-08-11 RX ADMIN — Medication 600 MILLIGRAM(S): at 08:42

## 2022-08-11 RX ADMIN — Medication 100 MILLIGRAM(S): at 12:07

## 2022-08-11 RX ADMIN — ENOXAPARIN SODIUM 40 MILLIGRAM(S): 100 INJECTION SUBCUTANEOUS at 21:41

## 2022-08-11 RX ADMIN — METHOCARBAMOL 500 MILLIGRAM(S): 500 TABLET, FILM COATED ORAL at 12:08

## 2022-08-11 RX ADMIN — Medication 1 MILLIGRAM(S): at 12:08

## 2022-08-11 RX ADMIN — METHOCARBAMOL 500 MILLIGRAM(S): 500 TABLET, FILM COATED ORAL at 05:51

## 2022-08-11 RX ADMIN — SENNA PLUS 2 TABLET(S): 8.6 TABLET ORAL at 21:41

## 2022-08-11 RX ADMIN — METHOCARBAMOL 500 MILLIGRAM(S): 500 TABLET, FILM COATED ORAL at 21:42

## 2022-08-11 RX ADMIN — PIPERACILLIN AND TAZOBACTAM 25 GRAM(S): 4; .5 INJECTION, POWDER, LYOPHILIZED, FOR SOLUTION INTRAVENOUS at 14:18

## 2022-08-11 RX ADMIN — TAMSULOSIN HYDROCHLORIDE 0.4 MILLIGRAM(S): 0.4 CAPSULE ORAL at 21:41

## 2022-08-11 RX ADMIN — Medication 1 TABLET(S): at 12:08

## 2022-08-11 RX ADMIN — Medication 3 MILLIGRAM(S): at 05:50

## 2022-08-11 RX ADMIN — Medication 5 MILLIGRAM(S): at 21:41

## 2022-08-11 RX ADMIN — PIPERACILLIN AND TAZOBACTAM 25 GRAM(S): 4; .5 INJECTION, POWDER, LYOPHILIZED, FOR SOLUTION INTRAVENOUS at 05:50

## 2022-08-11 RX ADMIN — PIPERACILLIN AND TAZOBACTAM 25 GRAM(S): 4; .5 INJECTION, POWDER, LYOPHILIZED, FOR SOLUTION INTRAVENOUS at 21:38

## 2022-08-11 RX ADMIN — Medication 600 MILLIGRAM(S): at 21:41

## 2022-08-11 RX ADMIN — Medication 10 MILLIGRAM(S): at 21:41

## 2022-08-11 NOTE — PROGRESS NOTE ADULT - ASSESSMENT
ASSESSMENT  56M presented with C6-7 B/L jumped facets s/p C6-7 ACDF & posterior cervical & thoracic decompression & fusion of C5-T2 on 8/4.    PLAN  - case d/w team  - no acute neurosurgical intervention indicated at this time  - pain control as needed, avoid over sedation  - C-collar to be worn at all times  - con't q4 neuro checks  - decadron taper to be completed today  -Robaxin 500 TID  - SBP   -Lovenox & SCDs for DVT PPX  -ID consulted for +blood cultures; continue Zosyn (8/7-8/12) for PNA, repeat blood cultures pending, original + culture likely contaminant   -ENT consulted for vocal cord paralysis; recommend continued therapy with SLP, could consider R TVF injection inpt/outpt to improve cough  -PT/OT/PMR recc [facility]

## 2022-08-11 NOTE — PROGRESS NOTE ADULT - SUBJECTIVE AND OBJECTIVE BOX
Patient feels well.  Reports spasms during the day.  Numbness is not painful.    REVIEW OF SYSTEMS  Constitutional - No fever,  +fatigue  Neurological - +loss of strength, +numbness     FUNCTIONAL PROGRESS  8/9 SLP  Speech Language Pathology Recommendations: 1. Initiate regular diet w/ thin liquids2. Aspiration precautions (slow rate, small bites/sips, alternate solids/liquids, upright for PO)3. Oral care4. SLP services no longer indicated at this time, please reconsult as needed     8/8 PT  Bed Mobility  Bed Mobility Training Sit-to-Supine: 2 person assist;  maximum assist (25% patient effort)  Bed Mobility Training Supine-to-Sit: maximum assist (25% patient effort);  2 person assist  Bed Mobility Training Limitations: decreased ability to use arms for pushing/pulling;  decreased ability to use legs for bridging/pushing;  impaired ability to control trunk for mobility;  decreased sensation;  decreased strength;  impaired balance;  impaired motor control;  impaired postural control;  impaired sensory feedback;  impaired coordination    Sit-Stand Transfer Training  Transfer Training Sit-to-Stand Transfer: unable to perform;  unsafe 2*2 abscent sensation in BLEs and impaired motor control of BLEs     VITALS  T(C): 36.6 (08-11-22 @ 07:48), Max: 37.3 (08-10-22 @ 11:37)  HR: 74 (08-11-22 @ 08:00) (58 - 74)  BP: 138/74 (08-11-22 @ 08:00) (106/59 - 142/78)  RR: 18 (08-11-22 @ 08:00) (15 - 20)  SpO2: 95% (08-11-22 @ 08:00) (91% - 98%)  Wt(kg): --    MEDICATIONS   acetaminophen     Tablet .. 650 milliGRAM(s) every 6 hours PRN  albuterol/ipratropium for Nebulization 3 milliLiter(s) every 6 hours PRN  ALPRAZolam 0.5 milliGRAM(s) every 12 hours PRN  bisacodyl Suppository 10 milliGRAM(s) daily PRN  chlorhexidine 2% Cloths 1 Application(s) daily  dexAMETHasone  Injectable     dexAMETHasone  Injectable 3 milliGRAM(s) daily  enoxaparin Injectable 40 milliGRAM(s) every 24 hours  folic acid 1 milliGRAM(s) daily  glycopyrrolate Injectable 0.1 milliGRAM(s) every 8 hours PRN  guaiFENesin  milliGRAM(s) every 12 hours  HYDROmorphone  Injectable 0.5 milliGRAM(s) every 4 hours PRN  melatonin 5 milliGRAM(s) at bedtime  methocarbamol 500 milliGRAM(s) every 8 hours  multivitamin 1 Tablet(s) daily  oxyCODONE    IR 5 milliGRAM(s) every 8 hours PRN  oxyCODONE    IR 10 milliGRAM(s) every 6 hours PRN  piperacillin/tazobactam IVPB.. 3.375 Gram(s) every 8 hours  tamsulosin 0.4 milliGRAM(s) at bedtime  thiamine 100 milliGRAM(s) daily      RECENT LABS/IMAGING                          12.6   13.82 )-----------( 330      ( 11 Aug 2022 05:48 )             35.9     08-11    137  |  104  |  19.1  ----------------------------<  107<H>  3.8   |  20.0<L>  |  0.59    Ca    7.9<L>      11 Aug 2022 05:48  Phos  2.9     08-11  Mg     2.3     08-11      MRI C SPINE 8/4 - 1. Comminuted displaced fracture of the left C6 inferior facet. Bilateral jumped facets at the C6-C7.  2. Grade 2 anterolisthesis at C6-C7 measuring 7 mm. Posterior epidural hemorrhage at the C7 and T1 levels.3. Mild compression fracture of the anterior superior endplate of C7/marginal osteophyte.4. Retropulsion of C7.5. Focal kyphosis at C6-C7 with severe widening of the C6-C7 inter spinous distance. Discontinuity of the inter spinous ligament and ligamentum flavum at C6-C7.6. Moderate spinal canal stenosis. Moderate compression of the cord at C6-C7.Motion artifact limits evaluation of the axial T2 images but increased T2 signal of the cord at C6-C7 seen on the sagittal images. No definitive visualized hemorrhage of the cord.          ----------------------------------------------------------------------------------------  PHYSICAL EXAM  Constitutional - NAD, Comfortable  Neck - +C-Collar  Extremities - No edema  Neurologic Exam -                    Cognitive - AAOx4     Motor -                      LEFT    UE - C5 4/5, C6 4/5, C7 4/5, C8 1/5, T1 0/5                    RIGHT UE - C5 4/5, C6 4/5, C7 4/5, C8 1/5, T1 0/5                    LEFT    LE - L2 0/5, L3 0/5, L4 0/5, L5 0/5, S1 0/5                    RIGHT LE - L2 0/5, L3 0/5, L4 0/5, L5 0/5, S1 0/5      Sensory - Intact to LT up to C7   Psychiatric - Mood stable, Affect WNL  ----------------------------------------------------------------------------------------  ASSESSMENT/PLAN  56yMale with functional deficits after an traumatic C6-7 fracture/dislocation   Traumatic C6-7 Fracture s/p ACDF and C5-T2 posterior instrumented fusion - C-Collar, decadron   ID - Zosyn   Pain - Tylenol, Oxycodone, Robaxin, DC IV Dilaudid   Neurogenic Bladder - Ramsey, Flomax  Neurogenic Bowel -  Dulcolax  Pulm - Incentive Spirometer, Duoneb  Spasticity - Baclofen 10mg HS (8/11)  DVT PPX - SCDs, Lovenox   Rehab - At this time, recommend [facility], patient DOES NOT meet acute inpatient rehabilitation criteria. Patient needs a more prolonged stay to achieve transition to community living and would not be able to tolerate a comprehensive/intense rehab program of 3hours/day.     Rehab recommendations are dependent on how functional progress changes as well as how patient continues to participate and tolerate therapeutic interventions, which may change. Recommend ongoing mobilization by staff to maintain cardiopulmonary function and prevention of secondary complications related to debility. Discussed with rehab team.
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
## 2022-08-12 LAB
ANION GAP SERPL CALC-SCNC: 13 MMOL/L — SIGNIFICANT CHANGE UP (ref 5–17)
BUN SERPL-MCNC: 15.1 MG/DL — SIGNIFICANT CHANGE UP (ref 8–20)
CALCIUM SERPL-MCNC: 7.8 MG/DL — LOW (ref 8.4–10.5)
CHLORIDE SERPL-SCNC: 102 MMOL/L — SIGNIFICANT CHANGE UP (ref 98–107)
CO2 SERPL-SCNC: 20 MMOL/L — LOW (ref 22–29)
CREAT SERPL-MCNC: 0.64 MG/DL — SIGNIFICANT CHANGE UP (ref 0.5–1.3)
CULTURE RESULTS: SIGNIFICANT CHANGE UP
EGFR: 110 ML/MIN/1.73M2 — SIGNIFICANT CHANGE UP
GLUCOSE SERPL-MCNC: 102 MG/DL — HIGH (ref 70–99)
HCT VFR BLD CALC: 36.5 % — LOW (ref 39–50)
HGB BLD-MCNC: 12.9 G/DL — LOW (ref 13–17)
MAGNESIUM SERPL-MCNC: 2 MG/DL — SIGNIFICANT CHANGE UP (ref 1.6–2.6)
MCHC RBC-ENTMCNC: 33.2 PG — SIGNIFICANT CHANGE UP (ref 27–34)
MCHC RBC-ENTMCNC: 35.3 GM/DL — SIGNIFICANT CHANGE UP (ref 32–36)
MCV RBC AUTO: 93.8 FL — SIGNIFICANT CHANGE UP (ref 80–100)
PHOSPHATE SERPL-MCNC: 3.1 MG/DL — SIGNIFICANT CHANGE UP (ref 2.4–4.7)
PLATELET # BLD AUTO: 370 K/UL — SIGNIFICANT CHANGE UP (ref 150–400)
POTASSIUM SERPL-MCNC: 3.9 MMOL/L — SIGNIFICANT CHANGE UP (ref 3.5–5.3)
POTASSIUM SERPL-SCNC: 3.9 MMOL/L — SIGNIFICANT CHANGE UP (ref 3.5–5.3)
RBC # BLD: 3.89 M/UL — LOW (ref 4.2–5.8)
RBC # FLD: 12.4 % — SIGNIFICANT CHANGE UP (ref 10.3–14.5)
SODIUM SERPL-SCNC: 135 MMOL/L — SIGNIFICANT CHANGE UP (ref 135–145)
SPECIMEN SOURCE: SIGNIFICANT CHANGE UP
WBC # BLD: 14.93 K/UL — HIGH (ref 3.8–10.5)
WBC # FLD AUTO: 14.93 K/UL — HIGH (ref 3.8–10.5)

## 2022-08-12 PROCEDURE — 99233 SBSQ HOSP IP/OBS HIGH 50: CPT

## 2022-08-12 RX ADMIN — Medication 5 MILLIGRAM(S): at 22:13

## 2022-08-12 RX ADMIN — METHOCARBAMOL 500 MILLIGRAM(S): 500 TABLET, FILM COATED ORAL at 22:07

## 2022-08-12 RX ADMIN — PIPERACILLIN AND TAZOBACTAM 25 GRAM(S): 4; .5 INJECTION, POWDER, LYOPHILIZED, FOR SOLUTION INTRAVENOUS at 06:04

## 2022-08-12 RX ADMIN — METHOCARBAMOL 500 MILLIGRAM(S): 500 TABLET, FILM COATED ORAL at 06:04

## 2022-08-12 RX ADMIN — TAMSULOSIN HYDROCHLORIDE 0.4 MILLIGRAM(S): 0.4 CAPSULE ORAL at 22:07

## 2022-08-12 RX ADMIN — Medication 1 MILLIGRAM(S): at 12:48

## 2022-08-12 RX ADMIN — CHLORHEXIDINE GLUCONATE 1 APPLICATION(S): 213 SOLUTION TOPICAL at 12:48

## 2022-08-12 RX ADMIN — Medication 600 MILLIGRAM(S): at 22:06

## 2022-08-12 RX ADMIN — Medication 3 MILLIGRAM(S): at 06:05

## 2022-08-12 RX ADMIN — Medication 1 TABLET(S): at 12:48

## 2022-08-12 RX ADMIN — POLYETHYLENE GLYCOL 3350 17 GRAM(S): 17 POWDER, FOR SOLUTION ORAL at 22:07

## 2022-08-12 RX ADMIN — METHOCARBAMOL 500 MILLIGRAM(S): 500 TABLET, FILM COATED ORAL at 13:24

## 2022-08-12 RX ADMIN — Medication 600 MILLIGRAM(S): at 09:03

## 2022-08-12 RX ADMIN — SENNA PLUS 2 TABLET(S): 8.6 TABLET ORAL at 22:07

## 2022-08-12 RX ADMIN — ENOXAPARIN SODIUM 40 MILLIGRAM(S): 100 INJECTION SUBCUTANEOUS at 22:06

## 2022-08-12 RX ADMIN — Medication 100 MILLIGRAM(S): at 12:48

## 2022-08-12 RX ADMIN — Medication 10 MILLIGRAM(S): at 22:06
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
## 2022-08-13 LAB — SARS-COV-2 RNA SPEC QL NAA+PROBE: SIGNIFICANT CHANGE UP

## 2022-08-13 PROCEDURE — 93970 EXTREMITY STUDY: CPT | Mod: 26

## 2022-08-13 PROCEDURE — 71045 X-RAY EXAM CHEST 1 VIEW: CPT | Mod: 26

## 2022-08-13 RX ADMIN — CHLORHEXIDINE GLUCONATE 1 APPLICATION(S): 213 SOLUTION TOPICAL at 13:16

## 2022-08-13 RX ADMIN — TAMSULOSIN HYDROCHLORIDE 0.4 MILLIGRAM(S): 0.4 CAPSULE ORAL at 21:02

## 2022-08-13 RX ADMIN — POLYETHYLENE GLYCOL 3350 17 GRAM(S): 17 POWDER, FOR SOLUTION ORAL at 21:05

## 2022-08-13 RX ADMIN — Medication 600 MILLIGRAM(S): at 21:03

## 2022-08-13 RX ADMIN — Medication 650 MILLIGRAM(S): at 05:11

## 2022-08-13 RX ADMIN — METHOCARBAMOL 500 MILLIGRAM(S): 500 TABLET, FILM COATED ORAL at 05:11

## 2022-08-13 RX ADMIN — ENOXAPARIN SODIUM 40 MILLIGRAM(S): 100 INJECTION SUBCUTANEOUS at 21:03

## 2022-08-13 RX ADMIN — SENNA PLUS 2 TABLET(S): 8.6 TABLET ORAL at 21:02

## 2022-08-13 RX ADMIN — Medication 600 MILLIGRAM(S): at 13:10

## 2022-08-13 RX ADMIN — Medication 650 MILLIGRAM(S): at 17:31

## 2022-08-13 RX ADMIN — Medication 1 TABLET(S): at 13:11

## 2022-08-13 RX ADMIN — Medication 1 MILLIGRAM(S): at 13:09

## 2022-08-13 RX ADMIN — Medication 0.5 MILLIGRAM(S): at 02:58

## 2022-08-13 RX ADMIN — Medication 100 MILLIGRAM(S): at 13:11

## 2022-08-13 RX ADMIN — Medication 5 MILLIGRAM(S): at 21:02

## 2022-08-13 RX ADMIN — Medication 10 MILLIGRAM(S): at 21:02

## 2022-08-13 RX ADMIN — METHOCARBAMOL 500 MILLIGRAM(S): 500 TABLET, FILM COATED ORAL at 13:10

## 2022-08-13 RX ADMIN — METHOCARBAMOL 500 MILLIGRAM(S): 500 TABLET, FILM COATED ORAL at 21:02
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
## 2022-08-14 LAB
ANION GAP SERPL CALC-SCNC: 13 MMOL/L — SIGNIFICANT CHANGE UP (ref 5–17)
APPEARANCE UR: CLEAR — SIGNIFICANT CHANGE UP
BILIRUB UR-MCNC: ABNORMAL
BUN SERPL-MCNC: 18 MG/DL — SIGNIFICANT CHANGE UP (ref 8–20)
CALCIUM SERPL-MCNC: 8.5 MG/DL — SIGNIFICANT CHANGE UP (ref 8.4–10.5)
CHLORIDE SERPL-SCNC: 99 MMOL/L — SIGNIFICANT CHANGE UP (ref 98–107)
CO2 SERPL-SCNC: 21 MMOL/L — LOW (ref 22–29)
COLOR SPEC: YELLOW — SIGNIFICANT CHANGE UP
COMMENT - URINE: SIGNIFICANT CHANGE UP
CREAT SERPL-MCNC: 0.57 MG/DL — SIGNIFICANT CHANGE UP (ref 0.5–1.3)
DIFF PNL FLD: NEGATIVE — SIGNIFICANT CHANGE UP
EGFR: 114 ML/MIN/1.73M2 — SIGNIFICANT CHANGE UP
GLUCOSE SERPL-MCNC: 101 MG/DL — HIGH (ref 70–99)
GLUCOSE UR QL: NEGATIVE MG/DL — SIGNIFICANT CHANGE UP
HCT VFR BLD CALC: 38.1 % — LOW (ref 39–50)
HGB BLD-MCNC: 13.6 G/DL — SIGNIFICANT CHANGE UP (ref 13–17)
KETONES UR-MCNC: NEGATIVE — SIGNIFICANT CHANGE UP
LEUKOCYTE ESTERASE UR-ACNC: ABNORMAL
MAGNESIUM SERPL-MCNC: 2.4 MG/DL — SIGNIFICANT CHANGE UP (ref 1.6–2.6)
MCHC RBC-ENTMCNC: 33.3 PG — SIGNIFICANT CHANGE UP (ref 27–34)
MCHC RBC-ENTMCNC: 35.7 GM/DL — SIGNIFICANT CHANGE UP (ref 32–36)
MCV RBC AUTO: 93.2 FL — SIGNIFICANT CHANGE UP (ref 80–100)
NITRITE UR-MCNC: NEGATIVE — SIGNIFICANT CHANGE UP
PH UR: 6 — SIGNIFICANT CHANGE UP (ref 5–8)
PHOSPHATE SERPL-MCNC: 3.6 MG/DL — SIGNIFICANT CHANGE UP (ref 2.4–4.7)
PLATELET # BLD AUTO: 374 K/UL — SIGNIFICANT CHANGE UP (ref 150–400)
POTASSIUM SERPL-MCNC: 4.3 MMOL/L — SIGNIFICANT CHANGE UP (ref 3.5–5.3)
POTASSIUM SERPL-SCNC: 4.3 MMOL/L — SIGNIFICANT CHANGE UP (ref 3.5–5.3)
PROT UR-MCNC: NEGATIVE — SIGNIFICANT CHANGE UP
RBC # BLD: 4.09 M/UL — LOW (ref 4.2–5.8)
RBC # FLD: 12.8 % — SIGNIFICANT CHANGE UP (ref 10.3–14.5)
RBC CASTS # UR COMP ASSIST: SIGNIFICANT CHANGE UP /HPF (ref 0–4)
SODIUM SERPL-SCNC: 132 MMOL/L — LOW (ref 135–145)
SP GR SPEC: 1.02 — SIGNIFICANT CHANGE UP (ref 1.01–1.02)
UROBILINOGEN FLD QL: 8 MG/DL
WBC # BLD: 15.14 K/UL — HIGH (ref 3.8–10.5)
WBC # FLD AUTO: 15.14 K/UL — HIGH (ref 3.8–10.5)
WBC UR QL: ABNORMAL /HPF (ref 0–5)

## 2022-08-14 RX ADMIN — CHLORHEXIDINE GLUCONATE 1 APPLICATION(S): 213 SOLUTION TOPICAL at 11:44

## 2022-08-14 RX ADMIN — Medication 1 MILLIGRAM(S): at 11:42

## 2022-08-14 RX ADMIN — Medication 100 MILLIGRAM(S): at 11:45

## 2022-08-14 RX ADMIN — ENOXAPARIN SODIUM 40 MILLIGRAM(S): 100 INJECTION SUBCUTANEOUS at 21:14

## 2022-08-14 RX ADMIN — METHOCARBAMOL 500 MILLIGRAM(S): 500 TABLET, FILM COATED ORAL at 21:13

## 2022-08-14 RX ADMIN — Medication 600 MILLIGRAM(S): at 21:14

## 2022-08-14 RX ADMIN — Medication 650 MILLIGRAM(S): at 23:32

## 2022-08-14 RX ADMIN — METHOCARBAMOL 500 MILLIGRAM(S): 500 TABLET, FILM COATED ORAL at 05:34

## 2022-08-14 RX ADMIN — Medication 10 MILLIGRAM(S): at 21:13

## 2022-08-14 RX ADMIN — METHOCARBAMOL 500 MILLIGRAM(S): 500 TABLET, FILM COATED ORAL at 13:37

## 2022-08-14 RX ADMIN — SENNA PLUS 2 TABLET(S): 8.6 TABLET ORAL at 21:13

## 2022-08-14 RX ADMIN — Medication 600 MILLIGRAM(S): at 09:43

## 2022-08-14 RX ADMIN — TAMSULOSIN HYDROCHLORIDE 0.4 MILLIGRAM(S): 0.4 CAPSULE ORAL at 21:12

## 2022-08-14 RX ADMIN — Medication 1 TABLET(S): at 11:43

## 2022-08-14 RX ADMIN — Medication 5 MILLIGRAM(S): at 21:13

## 2022-08-14 NOTE — PROGRESS NOTE ADULT - SUBJECTIVE AND OBJECTIVE BOX
INTERVAL OVERNIGHT EVENTS: 8/14 No overnight events reported.   POD # 10 ACDF 6/7, Posterior decompression & fusion C5-T2  56y Male seen lying  in bed, cervical collar on.  Tolerating diet. Voiding.  No new complaints.     Vital Signs Last 24 Hrs  T(C): 36.8 (14 Aug 2022 07:05), Max: 39.1 (13 Aug 2022 17:28)  T(F): 98.3 (14 Aug 2022 07:05), Max: 102.4 (13 Aug 2022 17:28)  HR: 77 (14 Aug 2022 07:05) (66 - 77)  BP: 99/63 (14 Aug 2022 07:05) (95/60 - 102/62)  BP(mean): --  RR: 18 (14 Aug 2022 07:05) (18 - 20)  SpO2: 92% (14 Aug 2022 07:05) (92% - 98%)    Parameters below as of 14 Aug 2022 07:05  Patient On (Oxygen Delivery Method): room air        PHYSICAL EXAM:  GENERAL: NAD, well-groomed, well-developed  HEAD:  Atraumatic, normocephalic  WOUND: intact  MENTAL STATUS: AAO x3, Appropriately conversant without aphasia, following simple commands  CRANIAL NERVES:  PERRL. EOMI without nystagmus. Facial sensation intact V1-3 distribution b/l. Face symmetric w/ normal eye closure and smile, tongue midline. Hearing grossly intact. Speech clear. Head turning and shoulder shrug intact.   MOTOR:   Uppers     Delt (C5/6)     Bicep (C5/6)          Tricep (C7)     HG (C8/T1)  R                     5/5              5/5                     5/5                   1/5                     L                      5/5              5/5                  5/5                       1/5                     Lowers      HF(L1/L2)     KE (L3)     DF (L4)     EHL (L5)     PF (S1)      R                     0/5           0/5         0/5           0/5            0/5  L                     0/5           0/5          0/5           0/5            0/5  SENSATION: decreased sensation in bilateral hands, no sensation from chest down     LABS:                        13.6   15.14 )-----------( 374      ( 14 Aug 2022 07:47 )             38.1     08-14    132<L>  |  99  |  18.0  ----------------------------<  101<H>  4.3   |  21.0<L>  |  0.57    Ca    8.5      14 Aug 2022 07:47  Phos  3.6     08-14  Mg     2.4     08-14        RADIOLOGY & ADDITIONAL TESTS:    US Duplex Venous Lower Ext Complete, Bilateral (08.13.22 @ 15:52)     IMPRESSION:  No evidence of deep venous thrombosis in either lower extremity.      CT Cervical Spine No Cont (08.04.22 @ 20:09)     The patient is status post ACDF at the C6/C7 level. Patient is status   post laminectomies at C6 and C7. Posterior cervical thoracic spinal   fusion hardware at the C5, C6, T1, T2 levels with bilateral screws and   vertical stabilizing rods. Extradural drainage catheter. Posterior   midline cutaneous staples. Postoperative soft tissue swelling, edema, and   fluid in the posterior soft tissues at level of the operative levels.   Previously seen jumped facets have been reduced.              CAPRINI SCORE [CLOT]:  Patient has an estimated Caprini score of greater than 5.  However, the patient's unique clinical situation will be addressed in an individual manner to determine appropriate anticoagulation treatment, if any.
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
## 2022-08-15 LAB
CULTURE RESULTS: SIGNIFICANT CHANGE UP
CULTURE RESULTS: SIGNIFICANT CHANGE UP
GRAM STN FLD: SIGNIFICANT CHANGE UP
SPECIMEN SOURCE: SIGNIFICANT CHANGE UP

## 2022-08-15 PROCEDURE — 99233 SBSQ HOSP IP/OBS HIGH 50: CPT

## 2022-08-15 PROCEDURE — 99223 1ST HOSP IP/OBS HIGH 75: CPT

## 2022-08-15 RX ORDER — CEFTRIAXONE 500 MG/1
1000 INJECTION, POWDER, FOR SOLUTION INTRAMUSCULAR; INTRAVENOUS EVERY 24 HOURS
Refills: 0 | Status: DISCONTINUED | OUTPATIENT
Start: 2022-08-16 | End: 2022-08-16

## 2022-08-15 RX ORDER — CEFTRIAXONE 500 MG/1
INJECTION, POWDER, FOR SOLUTION INTRAMUSCULAR; INTRAVENOUS
Refills: 0 | Status: DISCONTINUED | OUTPATIENT
Start: 2022-08-15 | End: 2022-08-16

## 2022-08-15 RX ORDER — CEFTRIAXONE 500 MG/1
1000 INJECTION, POWDER, FOR SOLUTION INTRAMUSCULAR; INTRAVENOUS ONCE
Refills: 0 | Status: COMPLETED | OUTPATIENT
Start: 2022-08-15 | End: 2022-08-15

## 2022-08-15 RX ADMIN — CHLORHEXIDINE GLUCONATE 1 APPLICATION(S): 213 SOLUTION TOPICAL at 11:10

## 2022-08-15 RX ADMIN — METHOCARBAMOL 500 MILLIGRAM(S): 500 TABLET, FILM COATED ORAL at 05:08

## 2022-08-15 RX ADMIN — Medication 5 MILLIGRAM(S): at 21:09

## 2022-08-15 RX ADMIN — Medication 1 TABLET(S): at 11:11

## 2022-08-15 RX ADMIN — Medication 600 MILLIGRAM(S): at 09:52

## 2022-08-15 RX ADMIN — METHOCARBAMOL 500 MILLIGRAM(S): 500 TABLET, FILM COATED ORAL at 21:10

## 2022-08-15 RX ADMIN — Medication 650 MILLIGRAM(S): at 16:27

## 2022-08-15 RX ADMIN — Medication 650 MILLIGRAM(S): at 00:32

## 2022-08-15 RX ADMIN — CEFTRIAXONE 100 MILLIGRAM(S): 500 INJECTION, POWDER, FOR SOLUTION INTRAMUSCULAR; INTRAVENOUS at 17:09

## 2022-08-15 RX ADMIN — METHOCARBAMOL 500 MILLIGRAM(S): 500 TABLET, FILM COATED ORAL at 13:27

## 2022-08-15 RX ADMIN — TAMSULOSIN HYDROCHLORIDE 0.4 MILLIGRAM(S): 0.4 CAPSULE ORAL at 21:10

## 2022-08-15 RX ADMIN — Medication 1 MILLIGRAM(S): at 11:11

## 2022-08-15 RX ADMIN — Medication 10 MILLIGRAM(S): at 21:11

## 2022-08-15 RX ADMIN — Medication 650 MILLIGRAM(S): at 17:30

## 2022-08-15 RX ADMIN — ENOXAPARIN SODIUM 40 MILLIGRAM(S): 100 INJECTION SUBCUTANEOUS at 21:11

## 2022-08-15 RX ADMIN — Medication 600 MILLIGRAM(S): at 21:10

## 2022-08-15 RX ADMIN — Medication 100 MILLIGRAM(S): at 11:12

## 2022-08-15 RX ADMIN — SENNA PLUS 2 TABLET(S): 8.6 TABLET ORAL at 21:10

## 2022-08-15 NOTE — CONSULT NOTE ADULT - SUBJECTIVE AND OBJECTIVE BOX
INFECTIOUS DISEASES AND INTERNAL MEDICINE at [facility]  =======================================================  Berlin Fall MD  Diplomates American Board of Internal Medicine and Infectious Diseases  Telephone [phone]  Fax            [phone]  =======================================================    BROCK [id number]      HPI:  Trauma SURGERY H&P    HPI: 5 56yMale admitted ON 8/3 WITH TRAUAM  with functional deficits after an traumatic C6-7 fracture/dislocation   Traumatic C6-7 Fracture s/p ACDF and C5-T2 posterior instrumented fusion - C-Collar, Decadron  RECENTLY WAS ON IV ZOSYN NOW OFF  PT WITH FEVERS   ASKED TO EVALUATE FROM ID STANDPOINT    I         FAMILY HISTORY:  No pertinent family history in first degree relatives      Family history not pertinent as reviewed with the patient.    SOCIAL HISTORY:  Denies any toxic habits other than etoh    ALLERGIES: NKA No Known Allergies        Home Medications:      --------------------------------------------------------------------------------------------    PHYSICAL EXAM:   General:  cspine in place, toxicated   Neuro: toxicated, can follow commands   HEENT: pupil 2mm reactive b/l , MMM  Cardio: RRR  Resp: saturating 97% on NC   GI/Abd: Soft, NT/ND, sensory loss from nipple down.   Vascular: All 4 extremities warm and well perfused, palpable B/L RA , palpable B/L DP  Musculoskeletal: B/L arm motor and sensory intact, Hand is sensory intact , unable to move his fingers. , B/L LE motor and sensory loss, no cspine tenderness , no stepoffs on back exam., loss of rectal tone.   --------------------------------------------------------------------------------------------    LABS                 14.3   7.93   )----------(  224       ( 04 Aug 2022 00:15 )               40.3      142    |  108    |  9.5    ----------------------------<  108        ( 04 Aug 2022 00:15 )  3.7     |  20.0   |  0.98     Ca    8.6        ( 04 Aug 2022 00:15 )  Phos  4.2       ( 04 Aug 2022 00:15 )  Mg     2.3       ( 04 Aug 2022 00:15 )    TPro  6.6    /  Alb  3.8    /  TBili  <0.2   /  DBili  x      /  AST  45     /  ALT  31     /  AlkPhos  83     ( 04 Aug 2022 00:15 )    LIVER FUNCTIONS - ( 04 Aug 2022 00:15 )  Alb: 3.8 g/dL / Pro: 6.6 g/dL / ALK PHOS: 83 U/L / ALT: 31 U/L / AST: 45 U/L / GGT: x           PT/INR -  11.8 sec / 1.02 ratio   ( 04 Aug 2022 00:15 )       PTT -  27.7 sec   ( 04 Aug 2022 00:15 )  CAPILLARY BLOOD GLUCOSE              --------------------------------------------------------------------------------------------  IMAGING  < from: CT Abdomen and Pelvis w/ IV Cont (22 @ 00:36) >    IMPRESSION:  No acute traumatic injury in the chest, abdomen or pelvis.    Scattered pulmonary nodules measuring up to 4 mm.  Based on 2017   Fleischner Society criteria, no additional follow-up imaging is required   for incidental pulmonary nodules measuring less than six oh meters.  An   optional follow-up chest CT scan may be considered in 12 months to   exclude lesion growth.    Hepatomegaly and hepatic steatosis.    Vas deferens calcifications, commonly seen in the setting of diabetes   mellitus.    < end of copied text >  < from: CT Chest w/ IV Cont (22 @ 00:36) >  IMPRESSION:  No acute traumatic injury in the chest, abdomen or pelvis.    Scattered pulmonary nodules measuring up to 4 mm.  Based on 2017   Fleischner Society criteria, no additional follow-up imaging is required   for incidental pulmonary nodules measuring less than six oh meters.  An   optional follow-up chest CT scan may be considered in 12 months to   exclude lesion growth.    Hepatomegaly and hepatic steatosis.    Vas deferens calcifications, commonly seen in the setting of diabetes   mellitus.    < end of copied text >   (04 Aug 2022 01:12)      PAST MEDICAL & SURGICAL HISTORY:  No pertinent past medical history      No significant past surgical history          ANTIBIOTICS      Allergies    No Known Allergies    Intolerances        SOCIAL HISTORY:     FAMILY HX   FAMILY HISTORY:  No pertinent family history in first degree relatives        Vital Signs Last 24 Hrs  T(C): 36.8 (15 Aug 2022 11:16), Max: 38.6 (14 Aug 2022 23:29)  T(F): 98.2 (15 Aug 2022 11:16), Max: 101.5 (14 Aug 2022 23:29)  HR: 71 (15 Aug 2022 11:16) (67 - 75)  BP: 110/70 (15 Aug 2022 11:16) (105/67 - 120/72)  BP(mean): --  RR: 19 (15 Aug 2022 11:16) (18 - 19)  SpO2: 98% (15 Aug 2022 11:16) (93% - 98%)    Parameters below as of 15 Aug 2022 08:00  Patient On (Oxygen Delivery Method): room air      Drug Dosing Weight  Height (cm): 165.1 (04 Aug 2022 00:00)  Weight (kg): 86 (04 Aug 2022 01:10)  BMI (kg/m2): 31.6 (04 Aug 2022 01:10)  BSA (m2): 1.93 (04 Aug 2022 01:10)      REVIEW OF SYSTEMS:    CONSTITUTIONAL:  As per HPI.    HEENT:  Eyes:  No diplopia or blurred vision. ENT:  No earache, sore throat or runny nose.    CARDIOVASCULAR:  No pressure, squeezing, strangling, tightness, heaviness or aching about the chest, neck, axilla or epigastrium.    RESPIRATORY:  No cough, shortness of breath, PND or orthopnea.    GASTROINTESTINAL:  No nausea, vomiting or diarrhea.    GENITOURINARY:  No dysuria, frequency or urgency.    MUSCULOSKELETAL:  As per HPI.    SKIN:  No change in skin, hair or nails.    NEUROLOGIC:  AS PER HPI                  PHYSICAL EXAMINATION:    GENERAL: The patient is a _____in no apparent distress. ___     VITAL SIGNS: T(C): 36.8 (08-15-22 @ 11:16), Max: 38.6 (22 @ 23:29)  HR: 71 (08-15-22 @ 11:16) (67 - 75)  BP: 110/70 (08-15-22 @ 11:16) (105/67 - 120/72)  RR: 19 (08-15-22 @ 11:16) (18 - 19)  SpO2: 98% (08-15-22 @ 11:16) (93% - 98%)  Wt(kg): --    HEENT: Head is normocephalic and atraumatic.  ANICTERIC  NECK: Supple. No carotid bruits.  No lymphadenopathy or thyromegaly.    LUNGS:COARSE BREATH SOUNDS    HEART: Regular rate and rhythm without murmur.    ABDOMEN: Soft, nontender, and MILD DISTENDED  Positive bowel sounds.  No hepatosplenomegaly was noted. NO REBOUND NO GUARDING    EXTREMITIES: NO EDEMA NO ERYTHEMA    NEUROLOGIC: AS PER HPI       SKIN: No ulceration or induration present. NO RASH        BLOOD CULTURES  Culture Results:   No growth to date. (08-10 @ 15:07)  Culture Results:   No growth to date. (08-10 @ 15:02)       URINE CX          LABS:                        13.6   15.14 )-----------( 374      ( 14 Aug 2022 07:47 )             38.1         132<L>  |  99  |  18.0  ----------------------------<  101<H>  4.3   |  21.0<L>  |  0.57    Ca    8.5      14 Aug 2022 07:47  Phos  3.6     08-14  Mg     2.4     08-14        Urinalysis Basic - ( 14 Aug 2022 12:38 )    Color: Yellow / Appearance: Clear / S.020 / pH: x  Gluc: x / Ketone: Negative  / Bili: Small / Urobili: 8 mg/dL   Blood: x / Protein: Negative / Nitrite: Negative   Leuk Esterase: Small / RBC: 0-2 /HPF / WBC 6-10 /HPF   Sq Epi: x / Non Sq Epi: x / Bacteria: x        RADIOLOGY & ADDITIONAL STUDIES:      ASSESSMENT/PLAN    56yMale admitted ON 8/3 WITH TRAUAM  with functional deficits after an traumatic C6-7 fracture/dislocation   Traumatic C6-7 Fracture s/p ACDF and C5-T2 posterior instrumented fusion - C-Collar, Decadron  RECENTLY WAS ON IV ZOSYN NOW OFF  PT WITH FEVERS  NO WOFF ABX  WILLOBTAIN BLOOD CX X2 SETS NO CLEAR SOURCE  ABD MILD DISTENTION    WILL RECOMMEND  ABD PELVIS AS WELL AS CHEST CT SCAN TO BETTER EVALUATE  FOR SOURCE OF FEVERS  AS PER NS INCISION  SITE OK 'WILL D.W NEUROSURGERY MAY NEED IMAGING OF NECK AS WELL    NON TOXIC AND JUST COMPLETED A COURSE OF ZOSYN WILL DEFER ABX FOR NOW UNLESS CLINICAL CHANGES  WILL TYLER GALINDO MD

## 2022-08-15 NOTE — PROGRESS NOTE ADULT - ASSESSMENT
56y Male presented with C6-7 B/L jumped facets s/p C6-7 ACDF & posterior cervical & thoracic decompression & fusion of C5-T2. POD#11.  -Febrile overnight TMAX 101.5 with increasing WBC count. Fever w/u negative so far, post-op incision healing well with no signs of infection. CXR with possible RLL infiltrate, waiting on official read from radiology.    Plan:  -D/w Dr. Gonzalez  -Q4h neuro checks  -Orthotist contacted to re-fit c-collar  -Robaxin 500 TID  -Pain control PRN  -Diet; regular with thin liquids per speech/swallow  -Lovenox & SCDs for DVT PPX  -Sputum culture ordered & pending  -ID consulted for assistance with fever work-up  -Dispo; plan for [facility] once medically cleared

## 2022-08-15 NOTE — CHART NOTE - NSCHARTNOTEFT_GEN_A_CORE
Urine culture prelim + for E.coli. Pt. started on Ceftriaxone. Dr. Dutta contacted & agrees with management.

## 2022-08-15 NOTE — PROGRESS NOTE ADULT - SUBJECTIVE AND OBJECTIVE BOX
Patient feels ok, reports had some breathing issues, but now are better.  Does not have IS, provided one and educated on use and frequency, as well as goals.   Discussed why it is important to have patient perform.   Reports pain is controlled as well as the spasms are improved.     REVIEW OF SYSTEMS  Constitutional - +fever,  +fatigue  Neurological - +loss of strength, +numbness, +tremors  Musculoskeletal - +joint pain, +joint swelling, No muscle pain    FUNCTIONAL PROGRESS   SLP  Speech Language Pathology Recommendations: 1. Initiate regular diet w/ thin liquids2. Aspiration precautions (slow rate, small bites/sips, alternate solids/liquids, upright for PO)3. Oral care4. SLP services no longer indicated at this time, please reconsult as needed      PT  Bed Mobility  Bed Mobility Training Sit-to-Supine: 2 person assist;  maximum assist (25% patient effort)  Bed Mobility Training Supine-to-Sit: maximum assist (25% patient effort);  2 person assist  Bed Mobility Training Limitations: decreased ability to use arms for pushing/pulling;  decreased ability to use legs for bridging/pushing;  impaired ability to control trunk for mobility;  decreased sensation;  decreased strength;  impaired balance;  impaired motor control;  impaired postural control;  impaired sensory feedback;  impaired coordination    Sit-Stand Transfer Training  Transfer Training Sit-to-Stand Transfer: unable to perform;  unsafe 2*2 abscent sensation in BLEs and impaired motor control of BLEs         VITALS  T(C): 36.9 (08-15-22 @ 07:16), Max: 38.6 (22 @ 23:29)  HR: 71 (08-15-22 @ 07:16) (71 - 75)  BP: 108/68 (08-15-22 @ 07:16) (97/64 - 120/72)  RR: 19 (08-15-22 @ 07:16) (18 - 19)  SpO2: 96% (08-15-22 @ 07:16) (96% - 98%)  Wt(kg): --    MEDICATIONS   acetaminophen     Tablet .. 650 milliGRAM(s) every 6 hours PRN  albuterol/ipratropium for Nebulization 3 milliLiter(s) every 6 hours PRN  baclofen 10 milliGRAM(s) at bedtime  bisacodyl Suppository 10 milliGRAM(s) daily PRN  chlorhexidine 2% Cloths 1 Application(s) daily  enoxaparin Injectable 40 milliGRAM(s) every 24 hours  folic acid 1 milliGRAM(s) daily  glycopyrrolate Injectable 0.1 milliGRAM(s) every 8 hours PRN  guaiFENesin  milliGRAM(s) every 12 hours  melatonin 5 milliGRAM(s) at bedtime  methocarbamol 500 milliGRAM(s) every 8 hours  multivitamin 1 Tablet(s) daily  polyethylene glycol 3350 17 Gram(s) at bedtime  senna 2 Tablet(s) at bedtime  tamsulosin 0.4 milliGRAM(s) at bedtime  thiamine 100 milliGRAM(s) daily      RECENT LABS/IMAGING                          13.6   15.14 )-----------( 374      ( 14 Aug 2022 07:47 )             38.1     08-14    132<L>  |  99  |  18.0  ----------------------------<  101<H>  4.3   |  21.0<L>  |  0.57    Ca    8.5      14 Aug 2022 07:47  Phos  3.6     08-14  Mg     2.4     08-14        Urinalysis Basic - ( 14 Aug 2022 12:38 )    Color: Yellow / Appearance: Clear / S.020 / pH: x  Gluc: x / Ketone: Negative  / Bili: Small / Urobili: 8 mg/dL   Blood: x / Protein: Negative / Nitrite: Negative   Leuk Esterase: Small / RBC: 0-2 /HPF / WBC 6-10 /HPF   Sq Epi: x / Non Sq Epi: x / Bacteria: x                MRI C SPINE . Comminuted displaced fracture of the left C6 inferior facet. Bilateral jumped facets at the C6-C7.  2. Grade 2 anterolisthesis at C6-C7 measuring 7 mm. Posterior epidural hemorrhage at the C7 and T1 levels.3. Mild compression fracture of the anterior superior endplate of C7/marginal osteophyte.4. Retropulsion of C7.5. Focal kyphosis at C6-C7 with severe widening of the C6-C7 inter spinous distance. Discontinuity of the inter spinous ligament and ligamentum flavum at C6-C7.6. Moderate spinal canal stenosis. Moderate compression of the cord at C6-C7.Motion artifact limits evaluation of the axial T2 images but increased T2 signal of the cord at C6-C7 seen on the sagittal images. No definitive visualized hemorrhage of the cord.    BLE V DOPPLER  - No evidence of deep venous thrombosis in either lower extremity.    ----------------------------------------------------------------------------------------  PHYSICAL EXAM  Constitutional - NAD, Comfortable  Neck - +C-Collar  Extremities - No edema  Neurologic Exam -                    Cognitive - AAOx4     Motor -                      LEFT    UE - C5 4/5, C6 4/5, C7 4/5, C8 1/5, T1 0/5                    RIGHT UE - C5 4/5, C6 4/5, C7 4/5, C8 1/5, T1 0/5                    LEFT    LE - L2 0/5, L3 0/5, L4 0/5, L5 0/5, S1 0/5                    RIGHT LE - L2 0/5, L3 0/5, L4 0/5, L5 0/5, S1 0/5      Sensory - Intact to LT up to C7   Psychiatric - Mood stable, Affect WNL  ----------------------------------------------------------------------------------------  ASSESSMENT/PLAN  56yMale with functional deficits after an traumatic C6-7 fracture/dislocation   Traumatic C6-7 Fracture s/p ACDF and C5-T2 posterior instrumented fusion - C-Collar, Decadron   ID - Zosyn   Pain - Tylenol, Oxycodone, Robaxin   Neurogenic Bladder - Ramsey, Flomax  Neurogenic Bowel -  Dulcolax  Pulm - Incentive Spirometer, Duoneb, Mucinex  Spasticity - Baclofen 10mg HS ()  DVT PPX - SCDs, Lovenox   Rehab -  Continue to recommend [facility], patient DOES NOT meet acute inpatient rehabilitation criteria. Patient needs a more prolonged stay to achieve transition to community living and would not be able to tolerate a comprehensive/intense rehab program of 3hours/day.     Rehab recommendations are dependent on how functional progress changes as well as how patient continues to participate and tolerate therapeutic interventions, which may change. Recommend ongoing mobilization by staff to maintain cardiopulmonary function and prevention of secondary complications related to debility. Discussed with rehab team.

## 2022-08-15 NOTE — PROGRESS NOTE ADULT - SUBJECTIVE AND OBJECTIVE BOX
PT. IS A 55 YOM with complex fracture dislocation at C-6 C7. Pt has an Aspen collar trhat is soiled and not fitting correctly. A new  Aspen collar was applied correctly with the ais of his nurse ,Amber. Extra pads were dispensed ..Brace fit well and was delivered. Any questions ,please call [facility] at [phone]
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
## 2022-08-15 NOTE — CONSULT NOTE ADULT - REASON FOR ADMISSION
Complex fracture dislocation at C6-C7

## 2022-08-16 LAB
-  AMIKACIN: SIGNIFICANT CHANGE UP
-  AMOXICILLIN/CLAVULANIC ACID: SIGNIFICANT CHANGE UP
-  AMPICILLIN/SULBACTAM: SIGNIFICANT CHANGE UP
-  AMPICILLIN: SIGNIFICANT CHANGE UP
-  AZTREONAM: SIGNIFICANT CHANGE UP
-  CEFAZOLIN: SIGNIFICANT CHANGE UP
-  CEFEPIME: SIGNIFICANT CHANGE UP
-  CEFTRIAXONE: SIGNIFICANT CHANGE UP
-  CIPROFLOXACIN: SIGNIFICANT CHANGE UP
-  ERTAPENEM: SIGNIFICANT CHANGE UP
-  GENTAMICIN: SIGNIFICANT CHANGE UP
-  IMIPENEM: SIGNIFICANT CHANGE UP
-  LEVOFLOXACIN: SIGNIFICANT CHANGE UP
-  MEROPENEM: SIGNIFICANT CHANGE UP
-  NITROFURANTOIN: SIGNIFICANT CHANGE UP
-  PIPERACILLIN/TAZOBACTAM: SIGNIFICANT CHANGE UP
-  TIGECYCLINE: SIGNIFICANT CHANGE UP
-  TOBRAMYCIN: SIGNIFICANT CHANGE UP
-  TRIMETHOPRIM/SULFAMETHOXAZOLE: SIGNIFICANT CHANGE UP
ANION GAP SERPL CALC-SCNC: 14 MMOL/L — SIGNIFICANT CHANGE UP (ref 5–17)
BUN SERPL-MCNC: 20.3 MG/DL — HIGH (ref 8–20)
CALCIUM SERPL-MCNC: 8.2 MG/DL — LOW (ref 8.4–10.5)
CHLORIDE SERPL-SCNC: 99 MMOL/L — SIGNIFICANT CHANGE UP (ref 98–107)
CO2 SERPL-SCNC: 20 MMOL/L — LOW (ref 22–29)
CREAT SERPL-MCNC: 0.51 MG/DL — SIGNIFICANT CHANGE UP (ref 0.5–1.3)
EGFR: 118 ML/MIN/1.73M2 — SIGNIFICANT CHANGE UP
GLUCOSE SERPL-MCNC: 107 MG/DL — HIGH (ref 70–99)
HCT VFR BLD CALC: 38.2 % — LOW (ref 39–50)
HGB BLD-MCNC: 13.1 G/DL — SIGNIFICANT CHANGE UP (ref 13–17)
MAGNESIUM SERPL-MCNC: 2.2 MG/DL — SIGNIFICANT CHANGE UP (ref 1.6–2.6)
MCHC RBC-ENTMCNC: 32.2 PG — SIGNIFICANT CHANGE UP (ref 27–34)
MCHC RBC-ENTMCNC: 34.3 GM/DL — SIGNIFICANT CHANGE UP (ref 32–36)
MCV RBC AUTO: 93.9 FL — SIGNIFICANT CHANGE UP (ref 80–100)
METHOD TYPE: SIGNIFICANT CHANGE UP
PHOSPHATE SERPL-MCNC: 3 MG/DL — SIGNIFICANT CHANGE UP (ref 2.4–4.7)
PLATELET # BLD AUTO: 361 K/UL — SIGNIFICANT CHANGE UP (ref 150–400)
POTASSIUM SERPL-MCNC: 4 MMOL/L — SIGNIFICANT CHANGE UP (ref 3.5–5.3)
POTASSIUM SERPL-SCNC: 4 MMOL/L — SIGNIFICANT CHANGE UP (ref 3.5–5.3)
RBC # BLD: 4.07 M/UL — LOW (ref 4.2–5.8)
RBC # FLD: 12.7 % — SIGNIFICANT CHANGE UP (ref 10.3–14.5)
SODIUM SERPL-SCNC: 133 MMOL/L — LOW (ref 135–145)
WBC # BLD: 13.04 K/UL — HIGH (ref 3.8–10.5)
WBC # FLD AUTO: 13.04 K/UL — HIGH (ref 3.8–10.5)

## 2022-08-16 PROCEDURE — 99232 SBSQ HOSP IP/OBS MODERATE 35: CPT

## 2022-08-16 PROCEDURE — 99233 SBSQ HOSP IP/OBS HIGH 50: CPT

## 2022-08-16 RX ORDER — MEROPENEM 1 G/30ML
1000 INJECTION INTRAVENOUS EVERY 8 HOURS
Refills: 0 | Status: DISCONTINUED | OUTPATIENT
Start: 2022-08-16 | End: 2022-08-19

## 2022-08-16 RX ADMIN — METHOCARBAMOL 500 MILLIGRAM(S): 500 TABLET, FILM COATED ORAL at 05:20

## 2022-08-16 RX ADMIN — Medication 600 MILLIGRAM(S): at 12:11

## 2022-08-16 RX ADMIN — METHOCARBAMOL 500 MILLIGRAM(S): 500 TABLET, FILM COATED ORAL at 15:11

## 2022-08-16 RX ADMIN — Medication 10 MILLIGRAM(S): at 12:07

## 2022-08-16 RX ADMIN — TAMSULOSIN HYDROCHLORIDE 0.4 MILLIGRAM(S): 0.4 CAPSULE ORAL at 21:06

## 2022-08-16 RX ADMIN — Medication 1 MILLIGRAM(S): at 12:12

## 2022-08-16 RX ADMIN — MEROPENEM 100 MILLIGRAM(S): 1 INJECTION INTRAVENOUS at 21:08

## 2022-08-16 RX ADMIN — MEROPENEM 100 MILLIGRAM(S): 1 INJECTION INTRAVENOUS at 16:51

## 2022-08-16 RX ADMIN — Medication 5 MILLIGRAM(S): at 21:07

## 2022-08-16 RX ADMIN — SENNA PLUS 2 TABLET(S): 8.6 TABLET ORAL at 21:06

## 2022-08-16 RX ADMIN — METHOCARBAMOL 500 MILLIGRAM(S): 500 TABLET, FILM COATED ORAL at 21:08

## 2022-08-16 RX ADMIN — Medication 1 TABLET(S): at 12:12

## 2022-08-16 RX ADMIN — Medication 10 MILLIGRAM(S): at 21:06

## 2022-08-16 RX ADMIN — Medication 650 MILLIGRAM(S): at 16:10

## 2022-08-16 RX ADMIN — Medication 650 MILLIGRAM(S): at 15:11

## 2022-08-16 RX ADMIN — Medication 600 MILLIGRAM(S): at 21:06

## 2022-08-16 RX ADMIN — Medication 100 MILLIGRAM(S): at 12:11

## 2022-08-16 RX ADMIN — ENOXAPARIN SODIUM 40 MILLIGRAM(S): 100 INJECTION SUBCUTANEOUS at 21:07

## 2022-08-16 RX ADMIN — CHLORHEXIDINE GLUCONATE 1 APPLICATION(S): 213 SOLUTION TOPICAL at 12:12

## 2022-08-16 NOTE — PROGRESS NOTE ADULT - ASSESSMENT
This is a 56ym suffered traumatic injury of bilat c 6-7 jumped facet.  POD# 12 ACDF c6-7 and posterior fusion of the C5-T2 posterior decompression and fusion   LBM 8/14  Plan  1. lower quad of the abd appears to be distended due to extension of the bladder, Bladder scan was requested and positive for greater than 1000cc of urine.  Pt was straight cath.  Pt was ordered bladder scan q 6 and straight.   2. Pt is presently being followed by ID and on Ceftriaxone to treat urine  3. lovenox -being administered for dvt prophylaxis  4. will consult urology   5. Physical and occupational therapy, PMR evaluated and states pt is not a acute rehab candidate
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
## 2022-08-16 NOTE — PROGRESS NOTE ADULT - SUBJECTIVE AND OBJECTIVE BOX
INFECTIOUS DISEASES AND INTERNAL MEDICINE at [facility]  =======================================================  Berlin Fall MD  Diplomates American Board of Internal Medicine and Infectious Diseases  Telephone [phone]  Fax            [phone]  =======================================================    BROCK HIGUERA [id number]    Follow up:    Allergies:  No Known Allergies      Medications:  acetaminophen     Tablet .. 650 milliGRAM(s) Oral every 6 hours PRN  albuterol/ipratropium for Nebulization 3 milliLiter(s) Nebulizer every 6 hours PRN  baclofen 10 milliGRAM(s) Oral at bedtime  bisacodyl Suppository 10 milliGRAM(s) Rectal daily PRN  chlorhexidine 2% Cloths 1 Application(s) Topical daily  enoxaparin Injectable 40 milliGRAM(s) SubCutaneous every 24 hours  folic acid 1 milliGRAM(s) Oral daily  glycopyrrolate Injectable 0.1 milliGRAM(s) IV Push every 8 hours PRN  guaiFENesin  milliGRAM(s) Oral every 12 hours  melatonin 5 milliGRAM(s) Oral at bedtime  meropenem  IVPB 1000 milliGRAM(s) IV Intermittent every 8 hours  methocarbamol 500 milliGRAM(s) Oral every 8 hours  multivitamin 1 Tablet(s) Oral daily  polyethylene glycol 3350 17 Gram(s) Oral at bedtime  saline laxative (FLEET) Rectal Enema 1 Enema Rectal once  senna 2 Tablet(s) Oral at bedtime  tamsulosin 0.4 milliGRAM(s) Oral at bedtime  thiamine 100 milliGRAM(s) Oral daily    SOCIAL       FAMILY   FAMILY HISTORY:  No pertinent family history in first degree relatives      REVIEW OF SYSTEMS:  CONSTITUTIONAL:  No Fever or chills  HEENT:   No diplopia or blurred vision.  No earache, sore throat or runny nose.  CARDIOVASCULAR:  No pressure, squeezing, strangling, tightness, heaviness or aching about the chest, neck, axilla or epigastrium.  RESPIRATORY:  No cough, shortness of breath, PND or orthopnea.  GASTROINTESTINAL:  No nausea, vomiting or diarrhea.  GENITOURINARY:  No dysuria, frequency or urgency. No Blood in urine  MUSCULOSKELETAL:   moves all joints  SKIN:  No change in skin, hair or nails.  NEUROLOGIC:  AS  PER HPI               Physical Exam:  ICU Vital Signs Last 24 Hrs  T(C): 36.8 (16 Aug 2022 07:15), Max: 37.1 (16 Aug 2022 00:00)  T(F): 98.3 (16 Aug 2022 07:15), Max: 98.7 (16 Aug 2022 00:00)  HR: 69 (16 Aug 2022 07:15) (69 - 79)  BP: 104/68 (16 Aug 2022 07:15) (104/68 - 116/71)  BP(mean): --  ABP: --  ABP(mean): --  RR: 18 (16 Aug 2022 07:15) (18 - 18)  SpO2: 93% (16 Aug 2022 07:15) (93% - 98%)    O2 Parameters below as of 16 Aug 2022 08:00  Patient On (Oxygen Delivery Method): room air          GEN: NAD,   HEENT: normocephalic and atraumatic. EOMI. DEBORAH.    NECK: Supple. No carotid bruits.  No lymphadenopathy or thyromegaly.  LUNGS: Clear to auscultation.  HEART: Regular rate and rhythm without murmur.  ABDOMEN: Soft, nontender, and nondistended.  Positive bowel sounds.    : No CVA tenderness  EXTREMITIES: Without any cyanosis, clubbing, rash, lesions or edema.  MSK: no joint swelling  NEUROLOGIC: PARAPLEGIC        Labs:  Vitals:  ============  T(F): 98.3 (16 Aug 2022 07:15), Max: 98.7 (16 Aug 2022 00:00)  HR: 69 (16 Aug 2022 07:15)  BP: 104/68 (16 Aug 2022 07:15)  RR: 18 (16 Aug 2022 07:15)  SpO2: 93% (16 Aug 2022 07:15) (93% - 98%)  temp max in last 48H T(F): , Max: 101.5 (08-14-22 @ 23:29)    =======================================================  Current Antibiotics:  meropenem  IVPB 1000 milliGRAM(s) IV Intermittent every 8 hours    Other medications:  baclofen 10 milliGRAM(s) Oral at bedtime  chlorhexidine 2% Cloths 1 Application(s) Topical daily  enoxaparin Injectable 40 milliGRAM(s) SubCutaneous every 24 hours  folic acid 1 milliGRAM(s) Oral daily  guaiFENesin  milliGRAM(s) Oral every 12 hours  melatonin 5 milliGRAM(s) Oral at bedtime  methocarbamol 500 milliGRAM(s) Oral every 8 hours  multivitamin 1 Tablet(s) Oral daily  polyethylene glycol 3350 17 Gram(s) Oral at bedtime  saline laxative (FLEET) Rectal Enema 1 Enema Rectal once  senna 2 Tablet(s) Oral at bedtime  tamsulosin 0.4 milliGRAM(s) Oral at bedtime  thiamine 100 milliGRAM(s) Oral daily      =======================================================  Labs:                        13.1   13.04 )-----------( 361      ( 16 Aug 2022 04:30 )             38.2     08-16    133<L>  |  99  |  20.3<H>  ----------------------------<  107<H>  4.0   |  20.0<L>  |  0.51    Ca    8.2<L>      16 Aug 2022 04:30  Phos  3.0     08-16  Mg     2.2     08-16        Culture - Sputum (collected 08-15-22 @ 11:17)  Source: .Sputum Sputum  Gram Stain (08-15-22 @ 21:16):    Moderate polymorphonuclear leukocytes per low power field    Few Squamous epithelial cells per low power field    Moderate Gram positive cocci in pairs per oil power field    Culture - Urine (collected 08-14-22 @ 12:38)  Source: Catheterized Catheterized  Organism: Escherichia coli ESBL (08-16-22 @ 15:07)  Organism: Escherichia coli ESBL (08-16-22 @ 15:07)    Sensitivities:      -  Amikacin: S <=16      -  Amoxicillin/Clavulanic Acid: I 16/8      -  Ampicillin: R >16 These ampicillin results predict results for amoxicillin      -  Ampicillin/Sulbactam: R >16/8 Enterobacter, Klebsiella aerogenes, Citrobacter, and Serratia may develop resistance during prolonged therapy (3-4 days)      -  Aztreonam: R >16      -  Cefazolin: R >16 (MIC_CL_COM_ENTERIC_CEFAZU) For uncomplicated UTI with K. pneumoniae, E. coli, or P. mirablis: NABILA <=16 is sensitive and NABILA >=32 is resistant. This also predicts results for oral agents cefaclor, cefdinir, cefpodoxime, cefprozil, cefuroxime axetil, cephalexin and locarbef for uncomplicated UTI. Note that some isolates may be susceptible to these agents while testing resistant to cefazolin.      -  Cefepime: R >16      -  Ceftriaxone: R >32 Enterobacter, Klebsiella aerogenes, Citrobacter, and Serratia may develop resistance during prolonged therapy      -  Ciprofloxacin: R >2      -  Ertapenem: S <=0.5      -  Gentamicin: S <=2      -  Imipenem: S <=1      -  Levofloxacin: R >4      -  Meropenem: S <=1      -  Nitrofurantoin: S <=32 Should not be used to treat pyelonephritis      -  Piperacillin/Tazobactam: S 16      -  Tigecycline: S <=2      -  Tobramycin: R >8      -  Trimethoprim/Sulfamethoxazole: S <=0.5/9.5      Method Type: NABILA    Culture - Blood (collected 08-10-22 @ 15:07)  Source: .Blood Blood  Final Report (08-15-22 @ 23:01):    No Growth Final    Culture - Blood (collected 08-10-22 @ 15:02)  Source: .Blood Blood  Final Report (08-15-22 @ 23:01):    No Growth Final    Culture - Blood (collected 08-07-22 @ 15:40)  Source: .Blood Blood-Peripheral  Final Report (08-12-22 @ 22:01):    No Growth Final    Culture - Blood (collected 08-07-22 @ 15:30)  Source: .Blood Blood-Peripheral  Gram Stain (08-08-22 @ 07:57):    Growth in aerobic and anaerobic bottles: Gram Positive Rods  Final Report (08-09-22 @ 11:17):    Growth in aerobic and anaerobic bottles: Bacillus species not anthracis    "Susceptibilities not performed"    ***Blood Panel PCR results on this specimen are available    approximately 3 hours after the Gram stain result.***    Gram stain, PCR, and/or culture results may not always    correspond due to difference in methodologies.    ************************************************************    This PCR assay was performed by multiplex PCR. This    Assay tests for 66 bacterial and resistance gene targets.    Please refer to the [facility] test directory    at [email or web address] for details.  Organism: Blood Culture PCR (08-09-22 @ 11:17)  Organism: Blood Culture PCR (08-09-22 @ 11:17)    Sensitivities:      -  Blood PCR Panel: NEG      Method Type: PCR    Culture - Sputum (collected 08-07-22 @ 12:00)  Source: .Sputum Sputum  Gram Stain (08-07-22 @ 23:18):    Few polymorphonuclear leukocytes per low power field    Rare Squamous epithelial cells per low power field    Few Gram positive cocci in pairs per oil power field    Rare Gram Variable Rods per oil power field  Final Report (08-09-22 @ 15:52):    Normal Respiratory Imelda present    Culture - Sputum (collected 08-05-22 @ 21:35)  Source: .Sputum Sputum  Gram Stain (08-06-22 @ 07:02):    Moderate polymorphonuclear leukocytes per low power field    Rare Squamous epithelial cells per low power field    Moderate Gram positive cocci in pairs seen per oil power field  Final Report (08-07-22 @ 17:32):    Normal Respiratory Imelda present      Creatinine, Serum: 0.51 mg/dL (08-16-22 @ 04:30)  Creatinine, Serum: 0.57 mg/dL (08-14-22 @ 07:47)  Creatinine, Serum: 0.64 mg/dL (08-12-22 @ 06:11)    Procalcitonin, Serum: 0.06 ng/mL (08-07-22 @ 15:30)          WBC Count: 13.04 K/uL (08-16-22 @ 04:30)  WBC Count: 15.14 K/uL (08-14-22 @ 07:47)  WBC Count: 14.93 K/uL (08-12-22 @ 06:11)    COVID-19 PCR: NotDetec (08-13-22 @ 06:10)  COVID-19 PCR: NotDetec (08-04-22 @ 00:45)
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
## 2022-08-17 LAB
CULTURE RESULTS: SIGNIFICANT CHANGE UP
SARS-COV-2 RNA SPEC QL NAA+PROBE: SIGNIFICANT CHANGE UP
SPECIMEN SOURCE: SIGNIFICANT CHANGE UP

## 2022-08-17 RX ORDER — HYDRALAZINE HCL 50 MG
5 TABLET ORAL EVERY 4 HOURS
Refills: 0 | Status: DISCONTINUED | OUTPATIENT
Start: 2022-08-17 | End: 2022-08-19

## 2022-08-17 RX ORDER — LABETALOL HCL 100 MG
5 TABLET ORAL EVERY 4 HOURS
Refills: 0 | Status: DISCONTINUED | OUTPATIENT
Start: 2022-08-17 | End: 2022-08-19

## 2022-08-17 RX ORDER — SODIUM CHLORIDE 9 MG/ML
500 INJECTION INTRAMUSCULAR; INTRAVENOUS; SUBCUTANEOUS ONCE
Refills: 0 | Status: COMPLETED | OUTPATIENT
Start: 2022-08-17 | End: 2022-08-18

## 2022-08-17 RX ADMIN — Medication 650 MILLIGRAM(S): at 23:04

## 2022-08-17 RX ADMIN — TAMSULOSIN HYDROCHLORIDE 0.4 MILLIGRAM(S): 0.4 CAPSULE ORAL at 22:03

## 2022-08-17 RX ADMIN — MEROPENEM 100 MILLIGRAM(S): 1 INJECTION INTRAVENOUS at 05:07

## 2022-08-17 RX ADMIN — Medication 600 MILLIGRAM(S): at 11:26

## 2022-08-17 RX ADMIN — METHOCARBAMOL 500 MILLIGRAM(S): 500 TABLET, FILM COATED ORAL at 16:13

## 2022-08-17 RX ADMIN — Medication 5 MILLIGRAM(S): at 22:03

## 2022-08-17 RX ADMIN — Medication 10 MILLIGRAM(S): at 22:02

## 2022-08-17 RX ADMIN — Medication 650 MILLIGRAM(S): at 01:15

## 2022-08-17 RX ADMIN — Medication 600 MILLIGRAM(S): at 22:02

## 2022-08-17 RX ADMIN — POLYETHYLENE GLYCOL 3350 17 GRAM(S): 17 POWDER, FOR SOLUTION ORAL at 22:03

## 2022-08-17 RX ADMIN — CHLORHEXIDINE GLUCONATE 1 APPLICATION(S): 213 SOLUTION TOPICAL at 11:26

## 2022-08-17 RX ADMIN — MEROPENEM 100 MILLIGRAM(S): 1 INJECTION INTRAVENOUS at 22:03

## 2022-08-17 RX ADMIN — Medication 1 MILLIGRAM(S): at 11:26

## 2022-08-17 RX ADMIN — Medication 1 TABLET(S): at 11:26

## 2022-08-17 RX ADMIN — Medication 650 MILLIGRAM(S): at 22:04

## 2022-08-17 RX ADMIN — MEROPENEM 100 MILLIGRAM(S): 1 INJECTION INTRAVENOUS at 16:13

## 2022-08-17 RX ADMIN — SENNA PLUS 2 TABLET(S): 8.6 TABLET ORAL at 22:03

## 2022-08-17 RX ADMIN — Medication 100 MILLIGRAM(S): at 11:26

## 2022-08-17 RX ADMIN — ENOXAPARIN SODIUM 40 MILLIGRAM(S): 100 INJECTION SUBCUTANEOUS at 22:03

## 2022-08-17 RX ADMIN — METHOCARBAMOL 500 MILLIGRAM(S): 500 TABLET, FILM COATED ORAL at 22:03

## 2022-08-17 RX ADMIN — METHOCARBAMOL 500 MILLIGRAM(S): 500 TABLET, FILM COATED ORAL at 05:06

## 2022-08-17 RX ADMIN — Medication 650 MILLIGRAM(S): at 00:16

## 2022-08-17 NOTE — OCCUPATIONAL THERAPY INITIAL EVALUATION ADULT - OCCUPATION
Pt initially states he works part time.  He later reports being retired, then states he's on disability.
works part-time.
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
## 2022-08-17 NOTE — OCCUPATIONAL THERAPY INITIAL EVALUATION ADULT - LIVES WITH, PROFILE
group home with 4 JEFFERY. Bedroom on 2nd level with full flight of stairs, uses a tub with curtain./alone
in a group home the past year.
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
## 2022-08-17 NOTE — OCCUPATIONAL THERAPY INITIAL EVALUATION ADULT - GENERAL OBSERVATIONS, REHAB EVAL
Pt received supine in bed, +bilateral VCDs, +IV, +montenegro, +cardiac monitor with , +central line, +drains, +cervical collar, agreeable to OT eval.
+ IV, + PRAFO, + Ramsey, + telemetry monitor, + cervical collar.
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
## 2022-08-17 NOTE — OCCUPATIONAL THERAPY INITIAL EVALUATION ADULT - LEVEL OF INDEPENDENCE: BED TO CHAIR, REHAB EVAL
unable to perform
Assess.  use of mechanical lift recommended for nursing staff if OOB desired./unable to perform

## 2022-08-17 NOTE — OCCUPATIONAL THERAPY INITIAL EVALUATION ADULT - EATING, ASSISTIVE DEVICE, OT EVAL
provided with red tubing to assist with grasping onto objects, needs assist with set up/large  utensils

## 2022-08-17 NOTE — OCCUPATIONAL THERAPY INITIAL EVALUATION ADULT - PRECAUTIONS/LIMITATIONS, REHAB EVAL
fall precautions/spinal precautions
cervical collar at all times/aspiration precautions/fall precautions/spinal precautions

## 2022-08-17 NOTE — OCCUPATIONAL THERAPY INITIAL EVALUATION ADULT - RANGE OF MOTION EXAMINATION, UPPER EXTREMITY
Active ROM as follows: Bilateral UE shoulder flexion/extension to 0-90 (limited by writer due to recent spinal surgery), abduction/adduction to 0-90, bilateral elbow flexion/extension WFL, bilateral wrist flexion/extension 3/4 actively, bilateral forearm supination/pronation WFL.  Pt did not demonstrate active gross grasp or digit extension but did demonstrate tenodesis with wrist flexion/extension.
PROM following spinal precautions WNL, AROM within spinal precautions appears WFL except with bilat wrist flexion and ulnar/radial deviations./bilateral UE Passive ROM was WNL (within normal limits)

## 2022-08-18 LAB
-  AMIKACIN: SIGNIFICANT CHANGE UP
-  AMOXICILLIN/CLAVULANIC ACID: SIGNIFICANT CHANGE UP
-  AMPICILLIN/SULBACTAM: SIGNIFICANT CHANGE UP
-  AMPICILLIN: SIGNIFICANT CHANGE UP
-  AZTREONAM: SIGNIFICANT CHANGE UP
-  CEFAZOLIN: SIGNIFICANT CHANGE UP
-  CEFEPIME: SIGNIFICANT CHANGE UP
-  CEFOXITIN: SIGNIFICANT CHANGE UP
-  CEFTRIAXONE: SIGNIFICANT CHANGE UP
-  CIPROFLOXACIN: SIGNIFICANT CHANGE UP
-  ERTAPENEM: SIGNIFICANT CHANGE UP
-  GENTAMICIN: SIGNIFICANT CHANGE UP
-  LEVOFLOXACIN: SIGNIFICANT CHANGE UP
-  MEROPENEM: SIGNIFICANT CHANGE UP
-  NITROFURANTOIN: SIGNIFICANT CHANGE UP
-  PIPERACILLIN/TAZOBACTAM: SIGNIFICANT CHANGE UP
-  TIGECYCLINE: SIGNIFICANT CHANGE UP
-  TOBRAMYCIN: SIGNIFICANT CHANGE UP
-  TRIMETHOPRIM/SULFAMETHOXAZOLE: SIGNIFICANT CHANGE UP
ANION GAP SERPL CALC-SCNC: 11 MMOL/L — SIGNIFICANT CHANGE UP (ref 5–17)
BUN SERPL-MCNC: 11 MG/DL — SIGNIFICANT CHANGE UP (ref 8–20)
CALCIUM SERPL-MCNC: 7.9 MG/DL — LOW (ref 8.4–10.5)
CHLORIDE SERPL-SCNC: 102 MMOL/L — SIGNIFICANT CHANGE UP (ref 98–107)
CO2 SERPL-SCNC: 23 MMOL/L — SIGNIFICANT CHANGE UP (ref 22–29)
CREAT SERPL-MCNC: 0.55 MG/DL — SIGNIFICANT CHANGE UP (ref 0.5–1.3)
CULTURE RESULTS: SIGNIFICANT CHANGE UP
EGFR: 115 ML/MIN/1.73M2 — SIGNIFICANT CHANGE UP
GLUCOSE SERPL-MCNC: 98 MG/DL — SIGNIFICANT CHANGE UP (ref 70–99)
HCT VFR BLD CALC: 35.2 % — LOW (ref 39–50)
HGB BLD-MCNC: 11.9 G/DL — LOW (ref 13–17)
MAGNESIUM SERPL-MCNC: 2.2 MG/DL — SIGNIFICANT CHANGE UP (ref 1.6–2.6)
MCHC RBC-ENTMCNC: 32.4 PG — SIGNIFICANT CHANGE UP (ref 27–34)
MCHC RBC-ENTMCNC: 33.8 GM/DL — SIGNIFICANT CHANGE UP (ref 32–36)
MCV RBC AUTO: 95.9 FL — SIGNIFICANT CHANGE UP (ref 80–100)
METHOD TYPE: SIGNIFICANT CHANGE UP
ORGANISM # SPEC MICROSCOPIC CNT: SIGNIFICANT CHANGE UP
PHOSPHATE SERPL-MCNC: 2.5 MG/DL — SIGNIFICANT CHANGE UP (ref 2.4–4.7)
PLATELET # BLD AUTO: 325 K/UL — SIGNIFICANT CHANGE UP (ref 150–400)
POTASSIUM SERPL-MCNC: 4 MMOL/L — SIGNIFICANT CHANGE UP (ref 3.5–5.3)
POTASSIUM SERPL-SCNC: 4 MMOL/L — SIGNIFICANT CHANGE UP (ref 3.5–5.3)
RBC # BLD: 3.67 M/UL — LOW (ref 4.2–5.8)
RBC # FLD: 12.9 % — SIGNIFICANT CHANGE UP (ref 10.3–14.5)
SODIUM SERPL-SCNC: 136 MMOL/L — SIGNIFICANT CHANGE UP (ref 135–145)
SPECIMEN SOURCE: SIGNIFICANT CHANGE UP
WBC # BLD: 11.7 K/UL — HIGH (ref 3.8–10.5)
WBC # FLD AUTO: 11.7 K/UL — HIGH (ref 3.8–10.5)

## 2022-08-18 PROCEDURE — 99232 SBSQ HOSP IP/OBS MODERATE 35: CPT

## 2022-08-18 RX ADMIN — MEROPENEM 100 MILLIGRAM(S): 1 INJECTION INTRAVENOUS at 21:37

## 2022-08-18 RX ADMIN — MEROPENEM 100 MILLIGRAM(S): 1 INJECTION INTRAVENOUS at 05:01

## 2022-08-18 RX ADMIN — METHOCARBAMOL 500 MILLIGRAM(S): 500 TABLET, FILM COATED ORAL at 13:40

## 2022-08-18 RX ADMIN — SODIUM CHLORIDE 500 MILLILITER(S): 9 INJECTION INTRAMUSCULAR; INTRAVENOUS; SUBCUTANEOUS at 00:09

## 2022-08-18 RX ADMIN — MEROPENEM 100 MILLIGRAM(S): 1 INJECTION INTRAVENOUS at 13:41

## 2022-08-18 RX ADMIN — Medication 100 MILLIGRAM(S): at 13:40

## 2022-08-18 RX ADMIN — POLYETHYLENE GLYCOL 3350 17 GRAM(S): 17 POWDER, FOR SOLUTION ORAL at 21:36

## 2022-08-18 RX ADMIN — Medication 1 TABLET(S): at 13:40

## 2022-08-18 RX ADMIN — SENNA PLUS 2 TABLET(S): 8.6 TABLET ORAL at 21:36

## 2022-08-18 RX ADMIN — Medication 1 MILLIGRAM(S): at 13:40

## 2022-08-18 RX ADMIN — Medication 650 MILLIGRAM(S): at 22:36

## 2022-08-18 RX ADMIN — Medication 600 MILLIGRAM(S): at 13:41

## 2022-08-18 RX ADMIN — CHLORHEXIDINE GLUCONATE 1 APPLICATION(S): 213 SOLUTION TOPICAL at 13:41

## 2022-08-18 RX ADMIN — METHOCARBAMOL 500 MILLIGRAM(S): 500 TABLET, FILM COATED ORAL at 21:37

## 2022-08-18 RX ADMIN — METHOCARBAMOL 500 MILLIGRAM(S): 500 TABLET, FILM COATED ORAL at 05:01

## 2022-08-18 RX ADMIN — ENOXAPARIN SODIUM 40 MILLIGRAM(S): 100 INJECTION SUBCUTANEOUS at 21:35

## 2022-08-18 RX ADMIN — Medication 650 MILLIGRAM(S): at 21:36

## 2022-08-18 RX ADMIN — Medication 600 MILLIGRAM(S): at 21:36

## 2022-08-18 RX ADMIN — Medication 5 MILLIGRAM(S): at 21:36

## 2022-08-18 RX ADMIN — TAMSULOSIN HYDROCHLORIDE 0.4 MILLIGRAM(S): 0.4 CAPSULE ORAL at 21:37
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
## 2022-08-18 NOTE — CHART NOTE - NSCHARTNOTEFT_GEN_A_CORE
Chart reviewed, orders noted.  Pt currently with functional 20G IV to left forearm with blood return this am.    ID note reviewed, no plan as of yet for long term abx or need for midline.  Stop date for abx 8/25.    Please reconsult for midline if needed for abx therapy.

## 2022-08-18 NOTE — CHART NOTE - NSCHARTNOTESELECT_GEN_ALL_CORE
Registered Dietitian
Transfer Note
UROLOGY CONSULT/Event Note
Event Note
Pt refusal of care/Event Note
TRAUMA SURGERY/Event Note
Transfer Note
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
## 2022-08-18 NOTE — PROGRESS NOTE ADULT - SUBJECTIVE AND OBJECTIVE BOX
INFECTIOUS DISEASES AND INTERNAL MEDICINE at [facility]  =======================================================  Berlin Fall MD  Diplomates American Board of Internal Medicine and Infectious Diseases  Telephone [phone]  Fax            [phone]  =======================================================    BROCK HIGUERA [id number]    Follow up: ESBL UTI    Allergies:  No Known Allergies      Medications:  acetaminophen     Tablet .. 650 milliGRAM(s) Oral every 6 hours PRN  albuterol/ipratropium for Nebulization 3 milliLiter(s) Nebulizer every 6 hours PRN  baclofen 10 milliGRAM(s) Oral at bedtime  bisacodyl Suppository 10 milliGRAM(s) Rectal daily PRN  chlorhexidine 2% Cloths 1 Application(s) Topical daily  enoxaparin Injectable 40 milliGRAM(s) SubCutaneous every 24 hours  folic acid 1 milliGRAM(s) Oral daily  glycopyrrolate Injectable 0.1 milliGRAM(s) IV Push every 8 hours PRN  guaiFENesin  milliGRAM(s) Oral every 12 hours  melatonin 5 milliGRAM(s) Oral at bedtime  meropenem  IVPB 1000 milliGRAM(s) IV Intermittent every 8 hours  methocarbamol 500 milliGRAM(s) Oral every 8 hours  multivitamin 1 Tablet(s) Oral daily  polyethylene glycol 3350 17 Gram(s) Oral at bedtime  saline laxative (FLEET) Rectal Enema 1 Enema Rectal once  senna 2 Tablet(s) Oral at bedtime  tamsulosin 0.4 milliGRAM(s) Oral at bedtime  thiamine 100 milliGRAM(s) Oral daily    SOCIAL       FAMILY   FAMILY HISTORY:  No pertinent family history in first degree relatives      REVIEW OF SYSTEMS:  CONSTITUTIONAL:  No Fever or chills  HEENT:   No diplopia or blurred vision.  No earache, sore throat or runny nose.  CARDIOVASCULAR:  No pressure, squeezing, strangling, tightness, heaviness or aching about the chest, neck, axilla or epigastrium.  RESPIRATORY:  No cough, shortness of breath, PND or orthopnea.  GASTROINTESTINAL:  No nausea, vomiting or diarrhea.  GENITOURINARY:  No dysuria, frequency or urgency. No Blood in urine  MUSCULOSKELETAL:   moves all joints  SKIN:  No change in skin, hair or nails.  NEUROLOGIC:  AS  PER HPI               Physical Exam:  I Vital Signs Last 24 Hrs  T(C): 37.2 (18 Aug 2022 15:30), Max: 37.3 (18 Aug 2022 07:19)  T(F): 98.9 (18 Aug 2022 15:30), Max: 99.1 (18 Aug 2022 07:19)  HR: 74 (18 Aug 2022 15:30) (72 - 96)  BP: 105/63 (18 Aug 2022 15:30) (95/58 - 120/67)  BP(mean): --  RR: 18 (18 Aug 2022 15:30) (17 - 18)  SpO2: 95% (18 Aug 2022 15:30) (95% - 99%)    Parameters below as of 18 Aug 2022 15:30  Patient On (Oxygen Delivery Method): room air        O2 Parameters below as of 16 Aug 2022 08:00  Patient On (Oxygen Delivery Method): room air          GEN: NAD, CERVICAL COLLAR  HEENT: normocephalic and atraumatic. EOMI. DEBORAH.    NECK: Supple. No carotid bruits.  No lymphadenopathy or thyromegaly.  LUNGS: Clear to auscultation.  HEART: Regular rate and rhythm without murmur.  ABDOMEN: Soft, nontender, and nondistended.  Positive bowel sounds.    : No CVA tenderness  EXTREMITIES: Without any cyanosis, clubbing, rash, lesions or edema.  MSK: no joint swelling  NEUROLOGIC: PARAPLEGIC        Labs:  Vitals:  ============  T(     =======================================================  Current Antibiotics:  meropenem  IVPB 1000 milliGRAM(s) IV Intermittent every 8 hours    Other medications:  baclofen 10 milliGRAM(s) Oral at bedtime  chlorhexidine 2% Cloths 1 Application(s) Topical daily  enoxaparin Injectable 40 milliGRAM(s) SubCutaneous every 24 hours  folic acid 1 milliGRAM(s) Oral daily  guaiFENesin  milliGRAM(s) Oral every 12 hours  melatonin 5 milliGRAM(s) Oral at bedtime  methocarbamol 500 milliGRAM(s) Oral every 8 hours  multivitamin 1 Tablet(s) Oral daily  polyethylene glycol 3350 17 Gram(s) Oral at bedtime  saline laxative (FLEET) Rectal Enema 1 Enema Rectal once  senna 2 Tablet(s) Oral at bedtime  tamsulosin 0.4 milliGRAM(s) Oral at bedtime  thiamine 100 milliGRAM(s) Oral daily      =======================================================  Labs:                                         11.9   11.70 )-----------( 325      ( 18 Aug 2022 05:25 )             35.2   08-18    136  |  102  |  11.0  ----------------------------<  98  4.0   |  23.0  |  0.55    Ca    7.9<L>      18 Aug 2022 05:25  Phos  2.5     08-18  Mg     2.2     08-18          Culture - Sputum (collected 08-15-22 @ 11:17)  Source: .Sputum Sputum  Gram Stain (08-15-22 @ 21:16):    Moderate polymorphonuclear leukocytes per low power field    Few Squamous epithelial cells per low power field    Moderate Gram positive cocci in pairs per oil power field    Culture - Urine (collected 08-14-22 @ 12:38)  Source: Catheterized Catheterized  Organism: Escherichia coli ESBL (08-16-22 @ 15:07)  Organism: Escherichia coli ESBL (08-16-22 @ 15:07)    Sensitivities:      -  Amikacin: S <=16      -  Amoxicillin/Clavulanic Acid: I 16/8      -  Ampicillin: R >16 These ampicillin results predict results for amoxicillin      -  Ampicillin/Sulbactam: R >16/8 Enterobacter, Klebsiella aerogenes, Citrobacter, and Serratia may develop resistance during prolonged therapy (3-4 days)      -  Aztreonam: R >16      -  Cefazolin: R >16 (MIC_CL_COM_ENTERIC_CEFAZU) For uncomplicated UTI with K. pneumoniae, E. coli, or P. mirablis: NABILA <=16 is sensitive and NABILA >=32 is resistant. This also predicts results for oral agents cefaclor, cefdinir, cefpodoxime, cefprozil, cefuroxime axetil, cephalexin and locarbef for uncomplicated UTI. Note that some isolates may be susceptible to these agents while testing resistant to cefazolin.      -  Cefepime: R >16      -  Ceftriaxone: R >32 Enterobacter, Klebsiella aerogenes, Citrobacter, and Serratia may develop resistance during prolonged therapy      -  Ciprofloxacin: R >2      -  Ertapenem: S <=0.5      -  Gentamicin: S <=2      -  Imipenem: S <=1      -  Levofloxacin: R >4      -  Meropenem: S <=1      -  Nitrofurantoin: S <=32 Should not be used to treat pyelonephritis      -  Piperacillin/Tazobactam: S 16      -  Tigecycline: S <=2      -  Tobramycin: R >8      -  Trimethoprim/Sulfamethoxazole: S <=0.5/9.5      Method Type: NABILA    Culture - Blood (collected 08-10-22 @ 15:07)  Source: .Blood Blood  Final Report (08-15-22 @ 23:01):    No Growth Final    Culture - Blood (collected 08-10-22 @ 15:02)  Source: .Blood Blood  Final Report (08-15-22 @ 23:01):    No Growth Final    Culture - Blood (collected 08-07-22 @ 15:40)  Source: .Blood Blood-Peripheral  Final Report (08-12-22 @ 22:01):    No Growth Final    Culture - Blood (collected 08-07-22 @ 15:30)  Source: .Blood Blood-Peripheral  Gram Stain (08-08-22 @ 07:57):    Growth in aerobic and anaerobic bottles: Gram Positive Rods  Final Report (08-09-22 @ 11:17):    Growth in aerobic and anaerobic bottles: Bacillus species not anthracis    "Susceptibilities not performed"    ***Blood Panel PCR results on this specimen are available    approximately 3 hours after the Gram stain result.***    Gram stain, PCR, and/or culture results may not always    correspond due to difference in methodologies.    ************************************************************    This PCR assay was performed by multiplex PCR. This    Assay tests for 66 bacterial and resistance gene targets.    Please refer to the [facility] test directory    at [email or web address] for details.  Organism: Blood Culture PCR (08-09-22 @ 11:17)  Organism: Blood Culture PCR (08-09-22 @ 11:17)    Sensitivities:      -  Blood PCR Panel: NEG      Method Type: PCR    Culture - Sputum (collected 08-07-22 @ 12:00)  Source: .Sputum Sputum  Gram Stain (08-07-22 @ 23:18):    Few polymorphonuclear leukocytes per low power field    Rare Squamous epithelial cells per low power field    Few Gram positive cocci in pairs per oil power field    Rare Gram Variable Rods per oil power field  Final Report (08-09-22 @ 15:52):    Normal Respiratory Imelda present    Culture - Sputum (collected 08-05-22 @ 21:35)  Source: .Sputum Sputum  Gram Stain (08-06-22 @ 07:02):    Moderate polymorphonuclear leukocytes per low power field    Rare Squamous epithelial cells per low power field    Moderate Gram positive cocci in pairs seen per oil power field  Final Report (08-07-22 @ 17:32):    Normal Respiratory Imelda present      Creatinine, Serum: 0.51 mg/dL (08-16-22 @ 04:30)  Creatinine, Serum: 0.57 mg/dL (08-14-22 @ 07:47)  Creatinine, Serum: 0.64 mg/dL (08-12-22 @ 06:11)    Procalcitonin, Serum: 0.06 ng/mL (08-07-22 @ 15:30)          WBC Count: 13.04 K/uL (08-16-22 @ 04:30)  WBC Count: 15.14 K/uL (08-14-22 @ 07:47)  WBC Count: 14.93 K/uL (08-12-22 @ 06:11)    COVID-19 PCR: NotDetec (08-13-22 @ 06:10)  COVID-19 PCR: NotDetec (08-04-22 @ 00:45)

## 2022-08-19 ENCOUNTER — TRANSCRIPTION ENCOUNTER (OUTPATIENT)
Age: 57
End: 2022-08-19

## 2022-08-19 VITALS
DIASTOLIC BLOOD PRESSURE: 62 MMHG | SYSTOLIC BLOOD PRESSURE: 108 MMHG | OXYGEN SATURATION: 96 % | TEMPERATURE: 99 F | HEART RATE: 75 BPM | RESPIRATION RATE: 18 BRPM

## 2022-08-19 PROCEDURE — 85610 PROTHROMBIN TIME: CPT

## 2022-08-19 PROCEDURE — 80048 BASIC METABOLIC PNL TOTAL CA: CPT

## 2022-08-19 PROCEDURE — 92611 MOTION FLUOROSCOPY/SWALLOW: CPT

## 2022-08-19 PROCEDURE — 82803 BLOOD GASES ANY COMBINATION: CPT

## 2022-08-19 PROCEDURE — 86900 BLOOD TYPING SEROLOGIC ABO: CPT

## 2022-08-19 PROCEDURE — 93005 ELECTROCARDIOGRAM TRACING: CPT

## 2022-08-19 PROCEDURE — 87040 BLOOD CULTURE FOR BACTERIA: CPT

## 2022-08-19 PROCEDURE — 71045 X-RAY EXAM CHEST 1 VIEW: CPT

## 2022-08-19 PROCEDURE — 80076 HEPATIC FUNCTION PANEL: CPT

## 2022-08-19 PROCEDURE — 94640 AIRWAY INHALATION TREATMENT: CPT

## 2022-08-19 PROCEDURE — 70450 CT HEAD/BRAIN W/O DYE: CPT | Mod: MG

## 2022-08-19 PROCEDURE — 85730 THROMBOPLASTIN TIME PARTIAL: CPT

## 2022-08-19 PROCEDURE — 97530 THERAPEUTIC ACTIVITIES: CPT

## 2022-08-19 PROCEDURE — 72050 X-RAY EXAM NECK SPINE 4/5VWS: CPT

## 2022-08-19 PROCEDURE — 92610 EVALUATE SWALLOWING FUNCTION: CPT

## 2022-08-19 PROCEDURE — 99285 EMERGENCY DEPT VISIT HI MDM: CPT

## 2022-08-19 PROCEDURE — 87077 CULTURE AEROBIC IDENTIFY: CPT

## 2022-08-19 PROCEDURE — 86803 HEPATITIS C AB TEST: CPT

## 2022-08-19 PROCEDURE — 81001 URINALYSIS AUTO W/SCOPE: CPT

## 2022-08-19 PROCEDURE — 72141 MRI NECK SPINE W/O DYE: CPT | Mod: MG

## 2022-08-19 PROCEDURE — 96360 HYDRATION IV INFUSION INIT: CPT

## 2022-08-19 PROCEDURE — 93970 EXTREMITY STUDY: CPT

## 2022-08-19 PROCEDURE — 87186 SC STD MICRODIL/AGAR DIL: CPT

## 2022-08-19 PROCEDURE — 72148 MRI LUMBAR SPINE W/O DYE: CPT | Mod: MA

## 2022-08-19 PROCEDURE — 80307 DRUG TEST PRSMV CHEM ANLYZR: CPT

## 2022-08-19 PROCEDURE — 87150 DNA/RNA AMPLIFIED PROBE: CPT

## 2022-08-19 PROCEDURE — 72146 MRI CHEST SPINE W/O DYE: CPT | Mod: MA

## 2022-08-19 PROCEDURE — 36415 COLL VENOUS BLD VENIPUNCTURE: CPT

## 2022-08-19 PROCEDURE — 97163 PT EVAL HIGH COMPLEX 45 MIN: CPT

## 2022-08-19 PROCEDURE — 74230 X-RAY XM SWLNG FUNCJ C+: CPT

## 2022-08-19 PROCEDURE — 86901 BLOOD TYPING SEROLOGIC RH(D): CPT

## 2022-08-19 PROCEDURE — 70498 CT ANGIOGRAPHY NECK: CPT

## 2022-08-19 PROCEDURE — 72125 CT NECK SPINE W/O DYE: CPT | Mod: MG

## 2022-08-19 PROCEDURE — 85027 COMPLETE CBC AUTOMATED: CPT

## 2022-08-19 PROCEDURE — 83735 ASSAY OF MAGNESIUM: CPT

## 2022-08-19 PROCEDURE — C1889: CPT

## 2022-08-19 PROCEDURE — 87086 URINE CULTURE/COLONY COUNT: CPT

## 2022-08-19 PROCEDURE — 87070 CULTURE OTHR SPECIMN AEROBIC: CPT

## 2022-08-19 PROCEDURE — U0003: CPT

## 2022-08-19 PROCEDURE — 74177 CT ABD & PELVIS W/CONTRAST: CPT | Mod: MA

## 2022-08-19 PROCEDURE — 84100 ASSAY OF PHOSPHORUS: CPT

## 2022-08-19 PROCEDURE — 87641 MR-STAPH DNA AMP PROBE: CPT

## 2022-08-19 PROCEDURE — 86850 RBC ANTIBODY SCREEN: CPT

## 2022-08-19 PROCEDURE — C1713: CPT

## 2022-08-19 PROCEDURE — 87640 STAPH A DNA AMP PROBE: CPT

## 2022-08-19 PROCEDURE — 97112 NEUROMUSCULAR REEDUCATION: CPT

## 2022-08-19 PROCEDURE — G1004: CPT

## 2022-08-19 PROCEDURE — 71260 CT THORAX DX C+: CPT | Mod: MG

## 2022-08-19 PROCEDURE — G0480: CPT

## 2022-08-19 PROCEDURE — 92526 ORAL FUNCTION THERAPY: CPT

## 2022-08-19 PROCEDURE — 83605 ASSAY OF LACTIC ACID: CPT

## 2022-08-19 PROCEDURE — U0005: CPT

## 2022-08-19 PROCEDURE — 84145 PROCALCITONIN (PCT): CPT

## 2022-08-19 PROCEDURE — 85025 COMPLETE CBC W/AUTO DIFF WBC: CPT

## 2022-08-19 PROCEDURE — 97167 OT EVAL HIGH COMPLEX 60 MIN: CPT

## 2022-08-19 PROCEDURE — 80053 COMPREHEN METABOLIC PANEL: CPT

## 2022-08-19 RX ORDER — ACETAMINOPHEN 500 MG
2 TABLET ORAL
Qty: 0 | Refills: 0 | DISCHARGE
Start: 2022-08-19

## 2022-08-19 RX ORDER — IPRATROPIUM/ALBUTEROL SULFATE 18-103MCG
3 AEROSOL WITH ADAPTER (GRAM) INHALATION
Qty: 0 | Refills: 0 | DISCHARGE
Start: 2022-08-19

## 2022-08-19 RX ORDER — TAMSULOSIN HYDROCHLORIDE 0.4 MG/1
1 CAPSULE ORAL
Qty: 0 | Refills: 0 | DISCHARGE
Start: 2022-08-19

## 2022-08-19 RX ORDER — THIAMINE MONONITRATE (VIT B1) 100 MG
1 TABLET ORAL
Qty: 0 | Refills: 0 | DISCHARGE
Start: 2022-08-19

## 2022-08-19 RX ORDER — MEROPENEM 1 G/30ML
1000 INJECTION INTRAVENOUS
Qty: 0 | Refills: 0 | DISCHARGE
Start: 2022-08-19

## 2022-08-19 RX ORDER — BACLOFEN 100 %
1 POWDER (GRAM) MISCELLANEOUS
Qty: 0 | Refills: 0 | DISCHARGE
Start: 2022-08-19

## 2022-08-19 RX ORDER — METHOCARBAMOL 500 MG/1
1 TABLET, FILM COATED ORAL
Qty: 0 | Refills: 0 | DISCHARGE
Start: 2022-08-19

## 2022-08-19 RX ORDER — FOLIC ACID 0.8 MG
1 TABLET ORAL
Qty: 0 | Refills: 0 | DISCHARGE
Start: 2022-08-19

## 2022-08-19 RX ORDER — LANOLIN ALCOHOL/MO/W.PET/CERES
1 CREAM (GRAM) TOPICAL
Qty: 0 | Refills: 0 | DISCHARGE
Start: 2022-08-19

## 2022-08-19 RX ADMIN — METHOCARBAMOL 500 MILLIGRAM(S): 500 TABLET, FILM COATED ORAL at 06:37

## 2022-08-19 RX ADMIN — Medication 1 TABLET(S): at 11:26

## 2022-08-19 RX ADMIN — Medication 650 MILLIGRAM(S): at 14:25

## 2022-08-19 RX ADMIN — METHOCARBAMOL 500 MILLIGRAM(S): 500 TABLET, FILM COATED ORAL at 14:26

## 2022-08-19 RX ADMIN — MEROPENEM 100 MILLIGRAM(S): 1 INJECTION INTRAVENOUS at 06:37

## 2022-08-19 RX ADMIN — Medication 650 MILLIGRAM(S): at 14:00

## 2022-08-19 RX ADMIN — MEROPENEM 100 MILLIGRAM(S): 1 INJECTION INTRAVENOUS at 14:28

## 2022-08-19 RX ADMIN — Medication 100 MILLIGRAM(S): at 11:26

## 2022-08-19 RX ADMIN — Medication 600 MILLIGRAM(S): at 11:24

## 2022-08-19 RX ADMIN — Medication 1 MILLIGRAM(S): at 11:25

## 2022-08-19 NOTE — DISCHARGE NOTE NURSING/CASE MANAGEMENT/SOCIAL WORK - PATIENT PORTAL LINK FT
You can access the FollowMyHealth Patient Portal offered by [facility] by registering at the following website: [email or web address]. By joining [facility]’s FollowMyHealth portal, you will also be able to view your health information using other applications (apps) compatible with our system.

## 2022-08-19 NOTE — DISCHARGE NOTE PROVIDER - NSDCCPCAREPLAN_GEN_ALL_CORE_FT
PRINCIPAL DISCHARGE DIAGNOSIS  Diagnosis: Closed dislocation of C6-C7 vertebra  Assessment and Plan of Treatment:

## 2022-08-19 NOTE — PROGRESS NOTE ADULT - THIS PATIENT HAS THE FOLLOWING CONDITION(S)/DIAGNOSES ON THIS ADMISSION:
None
Spinal Cord Injury
None
None
C spine injury
None
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
## 2022-08-19 NOTE — DISCHARGE NOTE PROVIDER - PROVIDER TOKENS
PROVIDER:[TOKEN:[[id number]]] PROVIDER:[TOKEN:[[id number]]],PROVIDER:[TOKEN:[[id number]]]

## 2022-08-19 NOTE — DISCHARGE NOTE PROVIDER - NSDCCPTREATMENT_GEN_ALL_CORE_FT
PRINCIPAL PROCEDURE  Procedure: Posterior cervical fusion with instrumentation  Findings and Treatment:

## 2022-08-19 NOTE — DISCHARGE NOTE PROVIDER - NSDCFUADDAPPT_GEN_ALL_CORE_FT
Instructions for follow-up, activity and diet: It is important to see your primary physician as well as the physicians noted below within the next week to perform a comprehensive medical review.  Call their offices for an appointment as soon as you leave the hospital.  If you do not have a primary physician, contact the [facility] [place] [phone] located on [address] [place] [address], [place] [address].  Your medical issues appear to be stable at this time, but if your symptoms recur or worsen, contact your physicians and/or return to the hospital if necessary.  If you encounter any issues or questions with your medication, call your physicians before stopping the medication.   Instructions for follow-up, activity and diet: It is important to see your primary physician as well as the physicians noted below within the next week to perform a comprehensive medical review.  Call their offices for an appointment as soon as you leave the hospital.  If you do not have a primary physician, contact the [facility] [place] [phone] located on [address] [place] [address], [place] [address].  Your medical issues appear to be stable at this time, but if your symptoms recur or worsen, contact your physicians and/or return to the hospital if necessary.  If you encounter any issues or questions with your medication, call your physicians before stopping the medication.    Make appointments with Dr. Gonzalez and Dr. Perez upon discharge to see them in their offices two weeks after discharge.

## 2022-08-19 NOTE — DISCHARGE NOTE NURSING/CASE MANAGEMENT/SOCIAL WORK - NSDCFUADDAPPT_GEN_ALL_CORE_FT
Instructions for follow-up, activity and diet: It is important to see your primary physician as well as the physicians noted below within the next week to perform a comprehensive medical review.  Call their offices for an appointment as soon as you leave the hospital.  If you do not have a primary physician, contact the [facility] [place] [phone] located on [address] [place] [address], [place] [address].  Your medical issues appear to be stable at this time, but if your symptoms recur or worsen, contact your physicians and/or return to the hospital if necessary.  If you encounter any issues or questions with your medication, call your physicians before stopping the medication.    Make appointments with Dr. Gonzalez and Dr. Perez upon discharge to see them in their offices two weeks after discharge.

## 2022-08-19 NOTE — DISCHARGE NOTE PROVIDER - CARE PROVIDER_API CALL
Trena Gonzalez)  Neurosurgery  [address] [place] [address]  Phone: [phone]  Fax: [phone]  Follow Up Time:    Trena Gonzalez)  Neurosurgery  [address] [place] [address]  Phone: [phone]  Fax: [phone]  Follow Up Time:     Reji Perez)  Urology  [address], [place] [address]  Phone: [phone]  Fax: [phone]  Follow Up Time:

## 2022-08-19 NOTE — DISCHARGE NOTE NURSING/CASE MANAGEMENT/SOCIAL WORK - NSDCPEFALRISK_GEN_ALL_CORE
For information on Fall & Injury Prevention, visit: [email or web address] OR  [email or web address] OR  https://www.cdc.gov/steadi/patient.html

## 2022-08-19 NOTE — PROGRESS NOTE ADULT - REASON FOR ADMISSION
Complex fracture dislocation at C6-C7
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
## 2022-08-20 LAB
CULTURE RESULTS: SIGNIFICANT CHANGE UP
CULTURE RESULTS: SIGNIFICANT CHANGE UP
SPECIMEN SOURCE: SIGNIFICANT CHANGE UP
SPECIMEN SOURCE: SIGNIFICANT CHANGE UP

## 2022-10-08 NOTE — PRE-OP CHECKLIST - ALLERGIES REVIEWED
Urine test result and instructions voicemail left with patient.     ----- Message from ALTA Taylor sent at 10/8/2022  8:07 AM CDT -----  Please inform the patient her urine culture is positive; the antibiotic she was prescribed should cover the bacteria appropriately-she should finish her antibiotic to completion, thank you.    
done

## 2023-01-17 NOTE — PHYSICAL THERAPY INITIAL EVALUATION ADULT - DID THE PATIENT HAVE SURGERY?
PSR reached out to patient to schedule - no answer, left detailed VM for return call but left date/time on VM
yes
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
## 2023-09-25 NOTE — CHART NOTE - NSCHARTNOTEFT_GEN_A_CORE
Attempted to round on patient this evening. After discussing plan of care with patient's nurse, she informed he is refusing care as he is not allowed to have water given aspiration concerns associated with vocal cord paralysis, pending MBS in AM. She asked if I would talk to patient. I attempted to speak to him however in response to my greeting and introduction of who I am he stated "ya I don't care I'm not doing anything tonight." I asked him what was going on to make him say that and he said he wants water. I expressed understanding and apologized, I attempted explain the findings of the scope today as well as our plan of care however was quickly interrupted by the patient yelling stating "I don't care, your making me talk and loose my voice now get out." I stated it was ok if he did not want to talk however I just wanted to explain to him why he cannot have water and asked if he would let me talk quickly about what is going on. The patient crossed his arms, closed his eyes and said "ya whatever but I am going to sleep." I told him that we concerned that with the findings of the paralysis, he was at a very high risk of aspiration which could worsen his already present pneumonia. He again interrupted me stating he has never aspirated, its not an issue and he knows best. He again stated he will not do anything tonight until he has water. He proceeded to yell "get out" repeatedly until I left the room.     Unable to assess patient's knowledge or understanding of risks associated with drinking water prior to official assessment with MBS due to patient cooperation. Pt to remain on Pureed diet with no liquids.     Unable to assess patient's mental status due to pt cooperation. RN additionally unable to assess mental status or provide care/medications due to pt cooperation at this time. Seen by [facility] and psychiatry-determined stable. Given DBT resources. Safety plan developed. Discharge instructions reviewed with patient including follow-up care plan. Educated on medication regime and advised not to stop prescribed medication without consulting their physician. Reviewed safety plan and outpatient resources/appointments.Verbalized understanding of discharge instructions. Denies SI. All belongings which where brought into the hospital have been returned to patient. Escorted off the unit @ 0023.   Discharged to  home. Transportation provided by her mother.     Attempted to round on patient this evening. After discussing plan of care with patient's nurse, she informed he is refusing care as he is not allowed to have water given aspiration concerns associated with vocal cord paralysis, pending MBS in AM. She asked if I would talk to patient. I attempted to speak to him however in response to my greeting and introduction of who I am he stated "ya I don't care I'm not doing anything tonight." I asked him what was going on to make him say that and he said he wants water. I expressed understanding and apologized. I attempted explain the findings of the scope today as well as our plan of care however was quickly interrupted by the patient yelling, stating "I don't care, you're making me talk and loose my voice. now get out." I stated it was ok if he did not want to talk however I just wanted to explain to him why he cannot have water and asked if he would let me talk quickly about what is going on. The patient crossed his arms, closed his eyes and said "ya whatever but I am going to sleep." I told him that we were concerned that with the findings of the paralysis, he was at a very high risk of aspiration which could worsen his already present aspiration pneumonia. He again interrupted me stating he has never aspirated, its not an issue and he knows best. He again stated he will not do anything tonight until he has water. He proceeded to yell "get out" repeatedly until I left the room.     Unable to assess patient's knowledge or understanding of risks associated with drinking water prior to official assessment with MBS due to patient cooperation. Pt to remain on Pureed diet with no liquids pending MBS.     Unable to assess patient's mental status due to pt cooperation. RN additionally unable to assess mental status or provide care/medications due to pt cooperation at this time.

## 2023-11-20 NOTE — OCCUPATIONAL THERAPY INITIAL EVALUATION ADULT - TRANSFER SAFETY CONCERNS NOTED: BED/CHAIR, REHAB EVAL
Spoke with Pt, He is aware. He is asking for refills for 90 days to be sent to his mail order. decreased weight-shifting ability

## 2024-03-19 NOTE — ED ADULT NURSE NOTE - NURSING MUSC STRENGTH
hand grasp, leg strength strong and equal bilaterally You can access the FollowMyHealth Patient Portal offered by [facility] by registering at the following website: [email or web address]. By joining [employer]’s FollowMyHealth portal, you will also be able to view your health information using other applications (apps) compatible with our system.

## 2024-09-06 NOTE — OCCUPATIONAL THERAPY INITIAL EVALUATION ADULT - MANUAL MUSCLE TESTING RESULTS, REHAB EVAL
Your strep A is negative. You have a throat culture pending. You are to download [employer] for the results in 3-4 days.  You will be notified if the results are + and an antibiotic will be called in for you.    You are to do warm salt water gargles 4 x daily.  Drink warm tea with honey and lemon.  Take tylenol or motrin as able for pain or fever.  Chloraseptic throat spray, cough drops.  Do not share utensils.  Change your tooth brush in 3 days.  Follow up with your PCP in 2-3 days  Go to the ED if symptoms worsen      You could check yourself at home for covid as well- there has been covid with yolanda.     
spinal precautions. proximal to wrist 3/5, wrist and intrinsic musculature 2-/5 bilat/not tested due to
3-/5 bilateral shoulders (but limited to 90 by this writer), bilateral elbows at least 3/5 (no resistance applied due to recent spinal surgery), bilateral wrist flexion/extension 3-/5, gross grasp and digit extension 0/5

## 2024-11-18 NOTE — PROGRESS NOTE ADULT - PROVIDER SPECIALTY LIST ADULT
-- DO NOT REPLY / DO NOT REPLY ALL --  -- This inbox is not monitored. If this was sent to the wrong provider or department, reroute message to P ECO Reroute pool. --  -- Message is from Engagement Center Operations (ECO) --    General Patient Message: Patients son states someone called and left a message to confirm patient would arrive in a fasting state for Wednesday appointment. No notes found in chart. Son states he is taking patient for all labs tomorrow morning and would like a call back to confirm that all labs that are needed are in the system and make sure that they are in deed fasting labs.   Caller Information       Contact Date/Time Type Contact Phone/Fax    11/18/2024 03:07 PM CST Phone (Incoming) Vladimir [phone]            Alternative phone number: n/a    Can a detailed message be left? Yes - Voicemail   Patient has been advised the message will be addressed within 2-3 business days.              Copied from CRM #[id number]. Topic: MW Messaging -  Patient Request  >> Nov 18, 2024  3:11 PM Ivanna BROWNE wrote:  Vladimir called during clinic working hours to follow up on previous message from the clinic.     Returning call NOT on same day OR no communication history located for that call. No encounter located.     Selected 'Wrap Up CRM' and created new Telephone Encounter after clicking 'Convert to Clinical Call'. Selected appropriate reason for call. Completed appropriate message template and routed as routine priority per Clinician KB page to appropriate clinician pool. Readback full message.  
Left voicemail informing her to get the fasting blood a day before the appointment and she would have to fast for at least 12 hours, all labs are in the system and any questions she can give us a call back.  
Neurosurgery
Orthopedics
Rehab Medicine
SICU
Anesthesia
NSICU
NSICU
Neurosurgery
Orthopedics
Rehab Medicine
Rehab Medicine
Infectious Disease
NSICU
Neurosurgery
Rehab Medicine
Infectious Disease
NSICU
Neurosurgery
ENT
NSICU

## 2025-03-24 NOTE — ED ADULT NURSE NOTE - NS ED NURSE LEVEL OF CONSCIOUSNESS MENTAL STATUS
I have personally seen and examined the patient. I have collaborated with and supervised the
Awake/Alert

## 2025-07-10 NOTE — ED PROVIDER NOTE - NSTIMEPROVIDERCAREINITIATE_GEN_ER
Detail Level: Detailed 11-Mar-2022 16:51 Depth Of Biopsy: dermis Was A Bandage Applied: Yes Size Of Lesion In Cm: 1.2 X Size Of Lesion In Cm: 0 Biopsy Type: H and E Biopsy Method: Dermablade Anesthesia Type: 1% lidocaine with epinephrine and a 1:10 solution of 8.4% sodium bicarbonate Anesthesia Volume In Cc: 0.5 Hemostasis: Drysol Wound Care: Mupirocin Dressing: pressure dressing with telfa Destruction After The Procedure: No Type Of Destruction Used: Curettage Curettage Text: The wound bed was treated with curettage after the biopsy was performed. Cryotherapy Text: The wound bed was treated with cryotherapy after the biopsy was performed. Electrodesiccation Text: The wound bed was treated with electrodesiccation after the biopsy was performed. Electrodesiccation And Curettage Text: The wound bed was treated with electrodesiccation and curettage after the biopsy was performed. Silver Nitrate Text: The wound bed was treated with silver nitrate after the biopsy was performed. Lab: [id number] Medical Necessity Information: It is in your best interest to select a reason for this procedure from the list below. All of these items fulfill various CMS LCD requirements except the new and changing color options. Consent: The provider’s intent is to obtain a tissue sample solely for diagnostic purposes. Written consent was obtained and risks were reviewed including but not limited to scarring, infection, bleeding, scabbing, incomplete removal, nerve damage and allergy to anesthesia. Post-Care Instructions: I reviewed with the patient in detail post-care instructions. Patient is to keep the biopsy site dry overnight, and then apply mupirocin twice daily until healed. Patient may apply aveeno soaks to remove any crusting. Notification Instructions: Patient will be notified of biopsy results. However, patient instructed to call the office if not contacted within 2 weeks. Billing Type: Third-Party Bill Information: Selecting Yes will display possible errors in your note based on the variables you have selected. This validation is only offered as a suggestion for you. PLEASE NOTE THAT THE VALIDATION TEXT WILL BE REMOVED WHEN YOU FINALIZE YOUR NOTE. IF YOU WANT TO FAX A PRELIMINARY NOTE YOU WILL NEED TO TOGGLE THIS TO 'NO' IF YOU DO NOT WANT IT IN YOUR FAXED NOTE. Lab: -[id number] Lab Facility: [id number] Size Of Lesion In Cm: 1

## 2025-07-17 NOTE — DISCHARGE NOTE PROVIDER - NSDCMRMEDTOKEN_GEN_ALL_CORE_FT
acetaminophen 325 mg oral tablet: 2 tab(s) orally every 6 hours, As needed, Temp greater or equal to 38C (100.4F), Mild Pain (1 - 3)  baclofen 10 mg oral tablet: 1 tab(s) orally once a day (at bedtime)  bisacodyl 10 mg rectal suppository: 1 suppository(ies) rectal once a day, As needed, for no BM x 3 days  folic acid 1 mg oral tablet: 1 tab(s) orally once a day  ipratropium-albuterol 0.5 mg-2.5 mg/3 mL inhalation solution: 3 milliliter(s) inhaled every 6 hours, As needed, Shortness of Breath and/or Wheezing  melatonin 5 mg oral tablet: 1 tab(s) orally once a day (at bedtime)  meropenem 1000 mg intravenous injection: 1000 milligram(s) intravenous every 8 hours  methocarbamol 500 mg oral tablet: 1 tab(s) orally every 8 hours  Multiple Vitamins oral tablet: 1 tab(s) orally once a day  tamsulosin 0.4 mg oral capsule: 1 cap(s) orally once a day (at bedtime)  thiamine 100 mg oral tablet: 1 tab(s) orally once a day  
done

## (undated) DEVICE — MARKER SKIN MULTI TIP 6"

## (undated) DEVICE — STAPLER SKIN VISI-STAT 35 WIDE

## (undated) DEVICE — DRAIN RESERVOIR FOR JACKSON PRATT 100CC CARDINAL

## (undated) DEVICE — SUT SILK 2-0 18" FS

## (undated) DEVICE — DRILL BIT K2 MEDICAL 2.3MM

## (undated) DEVICE — Device

## (undated) DEVICE — DRSG TEGADERM 4X4.75"

## (undated) DEVICE — DRSG SAFETAC FOAM MEPILEX 4X10"

## (undated) DEVICE — SUT VICRYL 3-0 18" SH UNDYED

## (undated) DEVICE — POSITIONER FOAM EGG CRATE ULNAR (2PCS)

## (undated) DEVICE — GOWN TRIMAX LG

## (undated) DEVICE — TAPE SILK 3"

## (undated) DEVICE — DRAPE LIGHT HANDLE COVER FLEX GREEN

## (undated) DEVICE — SUT VICRYL 0 18" CT-1 UNDYED

## (undated) DEVICE — GLV 8 PROTEXIS

## (undated) DEVICE — SPONGE RAYTEC 4X4 16PLY

## (undated) DEVICE — CONTAINER SPECIMEN 100ML

## (undated) DEVICE — DRAIN JACKSON PRATT 7MM FLAT 3/4 NO TROCAR

## (undated) DEVICE — COVER CAP 27" DEPTH

## (undated) DEVICE — MEDICATION LABELS W MARKER

## (undated) DEVICE — DRAPE HALF SHEET 40X57"

## (undated) DEVICE — SPONGE PATTY NEURO 1/4 X 3IN

## (undated) DEVICE — SOL IRR POUR H2O 250ML

## (undated) DEVICE — SPONGE PATTY X-RAY 0.5X3"

## (undated) DEVICE — DRSG 2X2

## (undated) DEVICE — SOL IRR POUR NS 0.9% 500ML

## (undated) DEVICE — GLV 8.5 PROTEXIS

## (undated) DEVICE — DRAPE C ARM UNIVERSAL

## (undated) DEVICE — SUT VICRYL 2-0 18" CP-2 UNDYED

## (undated) DEVICE — WRAP COMPRESSION CALF LG

## (undated) DEVICE — GLV 7.5 PROTEXIS

## (undated) DEVICE — WRAP COMPRESSION CALF MED

## (undated) DEVICE — DRAIN JACKSON PRATT 3 SPRING RESERVOIR W 10FR PVC DRAIN

## (undated) DEVICE — DRAPE TOWEL BLUE 17" X 24"

## (undated) DEVICE — ELCTR BOVIE HANDPIECE PENCIL

## (undated) DEVICE — ELCTR EDGE BOVIE INSULATED BLADE TIP 2.75"

## (undated) DEVICE — SUT DERMABOND PRINEO 60CM

## (undated) DEVICE — DRAPE MAYO STAND 30"

## (undated) DEVICE — DRSG XEROFORM 1"

## (undated) DEVICE — SPONGE LAP X-RAY DETECTABLE 18X18"

## (undated) DEVICE — FOLEY TRAY 16FR 5CC LTX UMETER CLOSED

## (undated) DEVICE — GLV 7 PROTEXIS

## (undated) DEVICE — DRAIN JACKSON PRATT 10MM FLAT FULL 15FR TROCAR

## (undated) DEVICE — POSITIONER CUSHION INSERT PRONE VIEW LG

## (undated) DEVICE — GLV 6.5 PROTEXIS

## (undated) DEVICE — DRAPE 3/4 SHEET W REINFORCEMENT 56X77"